# Patient Record
Sex: MALE | Race: WHITE | NOT HISPANIC OR LATINO | Employment: FULL TIME | URBAN - METROPOLITAN AREA
[De-identification: names, ages, dates, MRNs, and addresses within clinical notes are randomized per-mention and may not be internally consistent; named-entity substitution may affect disease eponyms.]

---

## 2021-02-13 ENCOUNTER — IMMUNIZATION (OUTPATIENT)
Dept: NURSING | Facility: CLINIC | Age: 36
End: 2021-02-13
Payer: COMMERCIAL

## 2021-02-13 PROCEDURE — 0001A PR COVID VAC PFIZER DIL RECON 30 MCG/0.3 ML IM: CPT

## 2021-02-13 PROCEDURE — 91300 PR COVID VAC PFIZER DIL RECON 30 MCG/0.3 ML IM: CPT

## 2021-03-06 ENCOUNTER — IMMUNIZATION (OUTPATIENT)
Dept: NURSING | Facility: CLINIC | Age: 36
End: 2021-03-06
Attending: INTERNAL MEDICINE
Payer: COMMERCIAL

## 2021-03-06 PROCEDURE — 91300 PR COVID VAC PFIZER DIL RECON 30 MCG/0.3 ML IM: CPT

## 2021-03-06 PROCEDURE — 0002A PR COVID VAC PFIZER DIL RECON 30 MCG/0.3 ML IM: CPT

## 2022-02-16 ENCOUNTER — TELEPHONE (OUTPATIENT)
Dept: SURGERY | Facility: CLINIC | Age: 37
End: 2022-02-16

## 2022-02-16 ENCOUNTER — OFFICE VISIT (OUTPATIENT)
Dept: SURGERY | Facility: CLINIC | Age: 37
End: 2022-02-16
Payer: COMMERCIAL

## 2022-02-16 VITALS
SYSTOLIC BLOOD PRESSURE: 120 MMHG | RESPIRATION RATE: 16 BRPM | DIASTOLIC BLOOD PRESSURE: 72 MMHG | HEART RATE: 80 BPM | BODY MASS INDEX: 21.82 KG/M2 | WEIGHT: 170 LBS | OXYGEN SATURATION: 98 % | HEIGHT: 74 IN

## 2022-02-16 DIAGNOSIS — Z11.59 ENCOUNTER FOR SCREENING FOR OTHER VIRAL DISEASES: Primary | ICD-10-CM

## 2022-02-16 DIAGNOSIS — K40.90 RIGHT INGUINAL HERNIA: Primary | ICD-10-CM

## 2022-02-16 PROCEDURE — 99203 OFFICE O/P NEW LOW 30 MIN: CPT | Performed by: SURGERY

## 2022-02-16 NOTE — PROGRESS NOTES
"[facility]  Surgery Consultation    HPI: Patient is a 36 year old male who is here for consultation requested by No primary care provider on file. None for evaluation of right inguinal hernia. Hernia has been present for last 1 weeks. Pain is primarily located in the right groin. Patient states pain is worse with standing. He attended 2 concerts and was on his feet for a long time causing increased symptoms.Pain is rated as moderate. Symptoms are improved with lying flat. Hernia has not been incarcerated. Patient has not had any symptoms of bowel obstruction. Patient denies fevers, chills, nausea, vomiting, SOB, chest pain, abdominal pain..    PMH:   has a past medical history of CARDIOVASCULAR SCREENING; LDL GOAL LESS THAN 160 and NO ACTIVE PROBLEMS.  PSH:    has a past surgical history that includes zzhc or ocular device intraop detached retina (2003).  Social History:   reports that he has never smoked. He has never used smokeless tobacco. He reports current alcohol use. He reports that he does not use drugs.  Family History:  Family history personally reviewed and revealed no pertinent history.    Medications/Allergies: Home medications and allergies reviewed.    ROS:  The 12 point Review of Systems is negative other than noted in the HPI.    Physical Exam:  /72   Pulse 80   Resp 16   Ht 1.886 m (6' 2.25\")   Wt 77.1 kg (170 lb)   SpO2 98%   BMI 21.68 kg/m    GENERAL: Generally appears well.  Psych: Alert and Oriented.  Normal affect  Eyes: Sclera clear  Respiratory:  Lungs with good air excursion  Cardiovascular:  Normal peripheral pulses  GI: Abdomen Soft Non-Tender entire abdomen No hernias palpated..  Groin- I examined the patient in both the standing and supine positions. Right Groin- moderate sized inguinal hernia and hernia was reducible Left Groin- no hernia palpated and no tenderness No scrotal or testicle abnormalities.  Lymphatic/Hematologic/Immune:  No cervical " lymphadenopathy.  Integumentary:  No rashes  Neurological: grossly intact     All new lab and imaging data was reviewed.     Impression and Plan:  Patient is a 36 year old male with right inguinal hernia    PLAN:  I discussed the pathophysiology of hernias and options for repair including laparoscopic VS open. He would like to go forward with robotic or laparoscopic repair with mesh.   The risks associated with the procedure including, but not limited to, recurrence, nerve entrapment or injury, persistence of pain, injury to the bowel/bladder, infertility, hematoma, mesh migration, mesh infection, MI, and PE were discussed with the patient. He indicated understanding of the discussion, asked appropriate questions, and provided consent. Signs and symptoms of incarceration were discussed. If these develop in the interim, he promises to call or go straight to the ER. I have provided the patient with an information pamphlet.  Thank you very much for this consult.    Deo Craig M.D.  [facility]  [phone]    Please route or send letter to:  Primary Care Provider (PCP) and Referring Provider

## 2022-02-16 NOTE — TELEPHONE ENCOUNTER
Type of surgery: robot assisted right inguinal hernia repair  Location of surgery: [facility]  Date and time of surgery: 3/1/22 8:10am  Surgeon: Dr Craig  Pre-Op Appt Date: pt to schedule  Post-Op Appt Date: pt to schedule   Packet sent out: Yes  Pre-cert/Authorization completed:  Not Applicable  Date: 2/16/22

## 2022-02-26 ENCOUNTER — LAB (OUTPATIENT)
Dept: URGENT CARE | Facility: URGENT CARE | Age: 37
End: 2022-02-26
Payer: COMMERCIAL

## 2022-02-26 DIAGNOSIS — Z11.59 ENCOUNTER FOR SCREENING FOR OTHER VIRAL DISEASES: ICD-10-CM

## 2022-02-26 PROCEDURE — U0003 INFECTIOUS AGENT DETECTION BY NUCLEIC ACID (DNA OR RNA); SEVERE ACUTE RESPIRATORY SYNDROME CORONAVIRUS 2 (SARS-COV-2) (CORONAVIRUS DISEASE [COVID-19]), AMPLIFIED PROBE TECHNIQUE, MAKING USE OF HIGH THROUGHPUT TECHNOLOGIES AS DESCRIBED BY CMS-2020-01-R: HCPCS

## 2022-02-26 PROCEDURE — U0005 INFEC AGEN DETEC AMPLI PROBE: HCPCS

## 2022-02-27 LAB — SARS-COV-2 RNA RESP QL NAA+PROBE: NEGATIVE

## 2022-02-28 ENCOUNTER — OFFICE VISIT (OUTPATIENT)
Dept: FAMILY MEDICINE | Facility: CLINIC | Age: 37
End: 2022-02-28
Payer: COMMERCIAL

## 2022-02-28 ENCOUNTER — ANESTHESIA EVENT (OUTPATIENT)
Dept: SURGERY | Facility: CLINIC | Age: 37
End: 2022-02-28
Payer: COMMERCIAL

## 2022-02-28 VITALS
TEMPERATURE: 97.6 F | SYSTOLIC BLOOD PRESSURE: 156 MMHG | RESPIRATION RATE: 16 BRPM | WEIGHT: 230 LBS | BODY MASS INDEX: 29.52 KG/M2 | HEIGHT: 74 IN | OXYGEN SATURATION: 99 % | HEART RATE: 100 BPM | DIASTOLIC BLOOD PRESSURE: 110 MMHG

## 2022-02-28 DIAGNOSIS — I45.10 INCOMPLETE RIGHT BUNDLE BRANCH BLOCK: ICD-10-CM

## 2022-02-28 DIAGNOSIS — Z01.818 PREOPERATIVE EXAMINATION: Primary | ICD-10-CM

## 2022-02-28 DIAGNOSIS — K40.90 RIGHT INGUINAL HERNIA: ICD-10-CM

## 2022-02-28 DIAGNOSIS — R03.0 ELEVATED BLOOD PRESSURE READING WITHOUT DIAGNOSIS OF HYPERTENSION: ICD-10-CM

## 2022-02-28 LAB
ANION GAP SERPL CALCULATED.3IONS-SCNC: 8 MMOL/L (ref 3–14)
BUN SERPL-MCNC: 11 MG/DL (ref 7–30)
CALCIUM SERPL-MCNC: 9.7 MG/DL (ref 8.5–10.1)
CHLORIDE BLD-SCNC: 105 MMOL/L (ref 94–109)
CO2 SERPL-SCNC: 25 MMOL/L (ref 20–32)
CREAT SERPL-MCNC: 0.9 MG/DL (ref 0.66–1.25)
ERYTHROCYTE [DISTWIDTH] IN BLOOD BY AUTOMATED COUNT: 12.6 % (ref 10–15)
GFR SERPL CREATININE-BSD FRML MDRD: >90 ML/MIN/1.73M2
GLUCOSE BLD-MCNC: 105 MG/DL (ref 70–99)
HCT VFR BLD AUTO: 51.6 % (ref 40–53)
HGB BLD-MCNC: 17.8 G/DL (ref 13.3–17.7)
MCH RBC QN AUTO: 31.8 PG (ref 26.5–33)
MCHC RBC AUTO-ENTMCNC: 34.5 G/DL (ref 31.5–36.5)
MCV RBC AUTO: 92 FL (ref 78–100)
PLATELET # BLD AUTO: 224 10E3/UL (ref 150–450)
POTASSIUM BLD-SCNC: 4.4 MMOL/L (ref 3.4–5.3)
RBC # BLD AUTO: 5.59 10E6/UL (ref 4.4–5.9)
SODIUM SERPL-SCNC: 138 MMOL/L (ref 133–144)
WBC # BLD AUTO: 5.7 10E3/UL (ref 4–11)

## 2022-02-28 PROCEDURE — 36415 COLL VENOUS BLD VENIPUNCTURE: CPT | Performed by: INTERNAL MEDICINE

## 2022-02-28 PROCEDURE — 99204 OFFICE O/P NEW MOD 45 MIN: CPT | Performed by: INTERNAL MEDICINE

## 2022-02-28 PROCEDURE — 85027 COMPLETE CBC AUTOMATED: CPT | Performed by: INTERNAL MEDICINE

## 2022-02-28 PROCEDURE — 80048 BASIC METABOLIC PNL TOTAL CA: CPT | Performed by: INTERNAL MEDICINE

## 2022-02-28 RX ORDER — AMLODIPINE BESYLATE 5 MG/1
5 TABLET ORAL DAILY
Qty: 10 TABLET | Refills: 0 | Status: SHIPPED | OUTPATIENT
Start: 2022-02-28 | End: 2022-03-07

## 2022-02-28 ASSESSMENT — ENCOUNTER SYMPTOMS
FEVER: 0
SHORTNESS OF BREATH: 0
CHILLS: 0
LIGHT-HEADEDNESS: 0
PALPITATIONS: 0
CHEST TIGHTNESS: 0
COUGH: 0
FATIGUE: 0

## 2022-02-28 NOTE — PATIENT INSTRUCTIONS
Avoid aspirin 7 days before the surgery. Avoid nonsteroidal anti-inflammatory pain medication like ibuprofen, Motrin, or Aleve 7 days before the surgery.  Tylenol can be used for pain.  Avoid any over the counter multivitamins or herbal supplement 7 days before surgery   You can resume these medications after surgery    Purchase a home blood pressure cuff that  checks your blood pressure on your arm not  your wrist.     Omron is a good brand. You can  check if the cuff you purchased is valid by going  to [email or web address]    Take your blood pressure after 5 minutes of rest  in the AM and PM for one week and record the  readings (14 total readings).    Send me a [email or web address] message with those  readings upon doing so.     Seek immediate medical attention if you develop:  - severe/worsening headache  - fever/chills  - nausea/vomiting  - changes in vision  - slurring of speech  - disorientation/confusion  - increased somnolence  - numbness/weakness of your arm or leg  - any other unusual symptoms

## 2022-02-28 NOTE — PROGRESS NOTES
[address], [address]  [facility] [address]  Phone: [phone]  Primary Provider: No primary care provider on file.  Pre-op Performing Provider: JEAN GONZALEZ      PREOPERATIVE EVALUATION:  Today's date: 2/28/2022    Femi Soni is a 37 year old male who presents for a preoperative evaluation.    Surgical Information:  Surgery/Procedure: Robot-assisted right inguinal hernia repair  Surgery Location:  OR  Surgeon: Dr. Craig  Surgery Date: 03/01/2022  Time of Surgery: 0810 am  Where patient plans to recover: At home with family  Fax number for surgical facility: Note does not need to be faxed, will be available electronically in Epic.    Type of Anesthesia Anticipated: General    Assessment & Plan     The proposed surgical procedure is considered INTERMEDIATE risk.    Preoperative examination    - CBC with platelets; Future  - Basic metabolic panel  (Ca, Cl, CO2, Creat, Gluc, K, Na, BUN); Future    Right inguinal hernia  Scheduled for procedure tomorrow.    Elevated blood pressure reading without diagnosis of hypertension  Discussed with the patient to take amlodipine 5 mg today and tomorrow morning before the procedure.  After he recovers from surgery he should make a follow-up appointment.  Discussed about checking blood pressure daily after his procedure.  He should seek immediate medical attention if he has alarm symptoms along with high blood pressure reading.  - amLODIPine (NORVASC) 5 MG tablet; Take 1 tablet (5 mg) by mouth daily    Incomplete right bundle branch block  Back in 2014 and 2018 patient had palpitations and an EKG and stress echo revealed incomplete right bundle branch block.  He had a visit with cardiologist in 2018 and was told that he does not have any heart disease.  Echo also showed normal LVEF and no wall motion abnormality.  He denies chest pain, palpitations or exertional symptoms.    Risks and Recommendations:  The patient has the  following additional risks and recommendations for perioperative complications:   - No identified additional risk factors other than previously addressed    Medication Instructions:  Patient is on no chronic medications    RECOMMENDATION:  APPROVAL GIVEN to proceed with proposed procedure pending review of diagnostic evaluation.    Subjective     HPI related to upcoming procedure:   Mr. Soni is a 37-year-old gentleman who presented to the clinic for preop exam.  He is new to [facility] and does not have a current primary care provider.  Patient is undergoing robot-assisted right inguinal hernia repair tomorrow which is 3/1/2022.  He has no medical conditions and does not take any medications.  He does have a past history of palpitations in 2014 and 2018.  A stress echo and an EKG showed right bundle branch block.  Patient had a visit with a cardiologist at [facility] and he was told that he does not have any heart disease and he should follow up with a primary care for blood pressure monitoring.  His blood pressure in the clinic today is 156/110 and repeat is 160/110.  He does not check his blood pressure at home.  He does not have any recent lab work.  Patient denies chest pain, shortness of breath, palpitations, headache, lightheadedness, syncope, fever or chills. Patient is able to climb 1 flight of stairs without feeling short of breath or having chest pain.  Patient denies personal or family history of complications with anesthesia. Patient does not have a history of heart failure or TIA/stroke. Patient does not smoke.  He drinks 8-12 alcoholic drinks per week      Preop Questions 2/28/2022   1. Have you ever had a heart attack or stroke? No   2. Have you ever had surgery on your heart or blood vessels, such as a stent placement, a coronary artery bypass, or surgery on an artery in your head, neck, heart, or legs? No   3. Do you have chest pain with activity? No   4. Do you have a history of  heart  failure? No   5. Do you currently have a cold, bronchitis or symptoms of other infection? No   6. Do you have a cough, shortness of breath, or wheezing? No   7. Do you or anyone in your family have previous history of blood clots? No   8. Do you or does anyone in your family have a serious bleeding problem such as prolonged bleeding following surgeries or cuts? No   9. Have you ever had problems with anemia or been told to take iron pills? No   10. Have you had any abnormal blood loss such as black, tarry or bloody stools? No   11. Have you ever had a blood transfusion? No   12. Are you willing to have a blood transfusion if it is medically needed before, during, or after your surgery? Yes   13. Have you or any of your relatives ever had problems with anesthesia? No   14. Do you have sleep apnea, excessive snoring or daytime drowsiness? No   15. Do you have any artifical heart valves or other implanted medical devices like a pacemaker, defibrillator, or continuous glucose monitor? No   16. Do you have artificial joints? No   17. Are you allergic to latex? No     Health Care Directive:  Patient does not have a Health Care Directive or Living Will: Discussed advance care planning with patient; however, patient declined at this time.    Preoperative Review of :   reviewed - no record of controlled substances prescribed.      Review of Systems   Constitutional: Negative for chills, fatigue and fever.   Respiratory: Negative for cough, chest tightness and shortness of breath.    Cardiovascular: Negative for chest pain and palpitations.   Neurological: Negative for syncope and light-headedness.   All other systems reviewed and are negative.      Patient Active Problem List    Diagnosis Date Noted     Preoperative examination 02/28/2022     Priority: Medium     Right inguinal hernia 02/28/2022     Priority: Medium     Elevated blood pressure reading without diagnosis of hypertension 02/28/2022     Priority: Medium  "    CARDIOVASCULAR SCREENING; LDL GOAL LESS THAN 160 02/27/2013     Priority: Medium      Past Medical History:   Diagnosis Date     CARDIOVASCULAR SCREENING; LDL GOAL LESS THAN 160      NO ACTIVE PROBLEMS      Past Surgical History:   Procedure Laterality Date     HC OR OCULAR DEVICE INTRAOP DETACHED RETINA  2003    Detached Retina Repair - Right Eye     Current Outpatient Medications   Medication Sig Dispense Refill     amLODIPine (NORVASC) 5 MG tablet Take 1 tablet (5 mg) by mouth daily 10 tablet 0       No Known Allergies     Social History     Tobacco Use     Smoking status: Never Smoker     Smokeless tobacco: Never Used   Substance Use Topics     Alcohol use: Yes     Comment: Socially - 5 to 10 drinks a week     History   Drug Use No         Objective     BP (!) 156/110 (BP Location: Right arm, Patient Position: Sitting, Cuff Size: Adult Regular)   Pulse 100   Temp 97.6  F (36.4  C) (Temporal)   Resp 16   Ht 1.886 m (6' 2.25\")   Wt 104.3 kg (230 lb)   SpO2 99%   BMI 29.33 kg/m      Physical Exam  Vitals reviewed.   Cardiovascular:      Rate and Rhythm: Normal rate and regular rhythm.      Heart sounds: Normal heart sounds. No murmur heard.    No gallop.   Pulmonary:      Effort: Pulmonary effort is normal. No respiratory distress.      Breath sounds: Normal breath sounds. No wheezing or rales.       No results for input(s): HGB, PLT, INR, NA, POTASSIUM, CR, A1C in the last 12735 hours.     Diagnostics:  Labs pending at this time.  Results will be reviewed when available.   No EKG required, no history of coronary heart disease, significant arrhythmia, peripheral arterial disease or other structural heart disease.    Revised Cardiac Risk Index (RCRI):  The patient has the following serious cardiovascular risks for perioperative complications:   - No serious cardiac risks = 0 points     RCRI Interpretation: 0 points: Class I (very low risk - 0.4% complication rate)         Signed Electronically by: JEAN " MD LISA  Copy of this evaluation report is provided to requesting physician.

## 2022-02-28 NOTE — NURSING NOTE
Dr. Cook notified of elevated blood pressure reading.    Ajith Cuevas, CRISTIAN on 2/28/2022 at 9:29 AM

## 2022-02-28 NOTE — LETTER
March 1, 2022      Femi Soni  [address]   [facility] [address]        Dear ,    Your test results are within normal limits.     Adela Cook MD    Resulted Orders   CBC with platelets   Result Value Ref Range    WBC Count 5.7 4.0 - 11.0 10e3/uL    RBC Count 5.59 4.40 - 5.90 10e6/uL    Hemoglobin 17.8 (H) 13.3 - 17.7 g/dL    Hematocrit 51.6 40.0 - 53.0 %    MCV 92 78 - 100 fL    MCH 31.8 26.5 - 33.0 pg    MCHC 34.5 31.5 - 36.5 g/dL    RDW 12.6 10.0 - 15.0 %    Platelet Count 224 150 - 450 10e3/uL   Basic metabolic panel  (Ca, Cl, CO2, Creat, Gluc, K, Na, BUN)   Result Value Ref Range    Sodium 138 133 - 144 mmol/L    Potassium 4.4 3.4 - 5.3 mmol/L    Chloride 105 94 - 109 mmol/L    Carbon Dioxide (CO2) 25 20 - 32 mmol/L    Anion Gap 8 3 - 14 mmol/L    Urea Nitrogen 11 7 - 30 mg/dL    Creatinine 0.90 0.66 - 1.25 mg/dL    Calcium 9.7 8.5 - 10.1 mg/dL    Glucose 105 (H) 70 - 99 mg/dL    GFR Estimate >90 >60 mL/min/1.73m2      Comment:      Effective December 21, 2021 eGFRcr in adults is calculated using the 2021 CKD-EPI creatinine equation which includes age and gender (Elias acosta al., NEJM, DOI: 10.1056/BEERaq8845245)

## 2022-03-01 ENCOUNTER — ANESTHESIA (OUTPATIENT)
Dept: SURGERY | Facility: CLINIC | Age: 37
End: 2022-03-01
Payer: COMMERCIAL

## 2022-03-01 ENCOUNTER — HOSPITAL ENCOUNTER (OUTPATIENT)
Facility: CLINIC | Age: 37
Discharge: HOME | End: 2022-03-01
Attending: SURGERY | Admitting: SURGERY
Payer: COMMERCIAL

## 2022-03-01 VITALS
DIASTOLIC BLOOD PRESSURE: 106 MMHG | HEART RATE: 103 BPM | OXYGEN SATURATION: 97 % | BODY MASS INDEX: 28.27 KG/M2 | TEMPERATURE: 98.3 F | SYSTOLIC BLOOD PRESSURE: 164 MMHG | WEIGHT: 227.4 LBS | HEIGHT: 75 IN | RESPIRATION RATE: 20 BRPM

## 2022-03-01 DIAGNOSIS — G89.18 POSTOPERATIVE PAIN: Primary | ICD-10-CM

## 2022-03-01 PROCEDURE — 258N000003 HC RX IP 258 OP 636: Performed by: ANESTHESIOLOGY

## 2022-03-01 PROCEDURE — 999N000141 HC STATISTIC PRE-PROCEDURE NURSING ASSESSMENT: Performed by: SURGERY

## 2022-03-01 RX ORDER — HYDROCODONE BITARTRATE AND ACETAMINOPHEN 5; 325 MG/1; MG/1
1 TABLET ORAL
Status: CANCELLED | OUTPATIENT
Start: 2022-03-01

## 2022-03-01 RX ORDER — HYDROCODONE BITARTRATE AND ACETAMINOPHEN 5; 325 MG/1; MG/1
1 TABLET ORAL EVERY 4 HOURS PRN
Qty: 12 TABLET | Refills: 0 | Status: SHIPPED | OUTPATIENT
Start: 2022-03-01 | End: 2022-03-07

## 2022-03-01 RX ORDER — CEFAZOLIN SODIUM/WATER 2 G/20 ML
2 SYRINGE (ML) INTRAVENOUS
Status: DISCONTINUED | OUTPATIENT
Start: 2022-03-01 | End: 2022-03-01 | Stop reason: HOSPADM

## 2022-03-01 RX ORDER — AMOXICILLIN 250 MG
1-2 CAPSULE ORAL 2 TIMES DAILY
Qty: 30 TABLET | Refills: 0 | Status: SHIPPED | OUTPATIENT
Start: 2022-03-01 | End: 2022-03-07

## 2022-03-01 RX ORDER — SODIUM CHLORIDE, SODIUM LACTATE, POTASSIUM CHLORIDE, CALCIUM CHLORIDE 600; 310; 30; 20 MG/100ML; MG/100ML; MG/100ML; MG/100ML
INJECTION, SOLUTION INTRAVENOUS CONTINUOUS
Status: DISCONTINUED | OUTPATIENT
Start: 2022-03-01 | End: 2022-03-01 | Stop reason: HOSPADM

## 2022-03-01 RX ORDER — CEFAZOLIN SODIUM/WATER 2 G/20 ML
2 SYRINGE (ML) INTRAVENOUS SEE ADMIN INSTRUCTIONS
Status: DISCONTINUED | OUTPATIENT
Start: 2022-03-01 | End: 2022-03-01 | Stop reason: HOSPADM

## 2022-03-01 RX ADMIN — SODIUM CHLORIDE, POTASSIUM CHLORIDE, SODIUM LACTATE AND CALCIUM CHLORIDE: 600; 310; 30; 20 INJECTION, SOLUTION INTRAVENOUS at 06:51

## 2022-03-01 NOTE — DISCHARGE INSTRUCTIONS
[facility]  Discharge Instructions: Post-Operative Robotic Inguinal Hernia Repair    ACTIVITY    Take frequent, short walks and increase your activity gradually.      Avoid strenuous physical activity or heavy lifting greater than 15 lbs. for 3-4 weeks.  You may climb stairs.    You may drive without restrictions when you are not using any prescription pain medication and feel comfortable in a car.    You may return to work/school when you are comfortable without any prescription pain medication.    WOUND CARE    You may remove your outer dressing or Band-Aids and shower 48 hours after the surgery.  Pat your incisions dry and leave them open to air.  Re-apply dressing (Band-Aids or gauze/tape) as needed for comfort or drainage.    You may have steri-strips (looks like white tape) on your incision.  You may peel off the steri-strips 2 weeks after your surgery if they have not peeled off on their own.     Do not soak your incisions in a tub or pool for 2 weeks.     Do not apply any lotions, creams, or ointments to your incisions.    A ridge under your incisions is normal and will gradually resolve.    DIET    Start with liquids, then gradually resume your regular diet as tolerated.     Drink plenty of fluids to stay hydrated.    PAIN    Expect some tenderness and discomfort at the incision site(s).  Use the prescribed pain medication at your discretion.  Expect gradual resolution of your pain over several days.    You may take ibuprofen with food (unless you have been told not to) or acetaminophen/Tylenol instead of or in addition to your prescribed pain medication.  If you are taking Norco, do not take any additional acetaminophen/Tylenol.    Do not drink alcohol or drive while you are taking pain medications.    You may apply ice to your incisions in 20 minute intervals as needed for the next 48 hours.  After that time, consider switching to heat if you prefer.    EXPECTATIONS    You  may notice air in your scrotum and/or penis after the surgery.  This is due to the gas used during the surgery.  You can expect some swelling and bruising involving the scrotum and/or penis as well as numbness.  These symptoms are expected and should gradually resolve in the next few days.  You may use ice to help with the swelling and try placing a rolled hand towel below your scrotum to help alleviate scrotal discomfort.  If you develop significant testicular or penile pain, please call our office and speak with a nurse.    Pain medications can cause constipation.  Limit use when possible.  Take over the counter stool softener/stimulant, such as Colace or Senna, 1-2 times a day with plenty of water.  You may take a mild over the counter laxative, such as Miralax or a suppository, as needed.  You may take 1 oz. (2 tablespoons) Milk of Magnesia the evening following surgery to encourage bowel movement.  You may discontinue these medications once you are having regular bowel movements and/or are no longer taking your narcotic pain medication.     You may have shoulder or upper back discomfort due to the gas used in surgery.  This is temporary and should resolve in 48-72 hours.  Short, frequent walks may help with this.    If you are unable to urinate, or feel as though you are not emptying your bladder adequately, we recommend you call our office and/or seek care at an ER or Urgent Care facility if after hours.    FOLLOW UP    Our office will contact you in approximately 2 weeks to check on your progress and answer any questions you may have.  If you are doing well, you will not need to return for a follow up appointment.  If any concerns are identified over the phone, we will help you make an appointment to see a provider.     If you have not received a phone call, have any questions or concerns, or would like to be seen, please call us at [phone] and ask to speak with our nurse.  We are located at [address]  [facility] [address] [place] [address].    CALL OUR OFFICE -[phone] IF YOU HAVE:     Chills or fever above 101 F.    Increased redness, warmth, or drainage at your incisions.    Significant bleeding.    Pain not relieved by your pain medication or rest.    Increasing pain after the first 48 hours.    Any other concerns or questions.               **If you have concerns or questions about your procedure,    please contact Dr. Craig at  [phone]**

## 2022-03-01 NOTE — ANESTHESIA PREPROCEDURE EVALUATION
Anesthesia Pre-Procedure Evaluation    Patient: Femi Soni   MRN: [id number] : 1985        Procedure : Procedure(s):  ROBOT-ASSISTED RIGHT INGUINAL HERNIA REPAIR          Past Medical History:   Diagnosis Date     CARDIOVASCULAR SCREENING; LDL GOAL LESS THAN 160      NO ACTIVE PROBLEMS       Past Surgical History:   Procedure Laterality Date     HC OR OCULAR DEVICE INTRAOP DETACHED RETINA      Detached Retina Repair - Right Eye      No Known Allergies   Social History     Tobacco Use     Smoking status: Never Smoker     Smokeless tobacco: Never Used   Substance Use Topics     Alcohol use: Yes     Comment: Socially - 5 to 10 drinks a week      Wt Readings from Last 1 Encounters:   22 104.3 kg (230 lb)        Anesthesia Evaluation            ROS/MED HX  ENT/Pulmonary:    (-) sleep apnea   Neurologic:       Cardiovascular: Comment: Elevated BP in clinic  - PCMD started Pt on amlodipine     IRBBB    (+) -----Irregular Heartbeat/Palpitations, Previous cardiac testing   Echo: Date: Results:    Stress Test: Date:  Results:  No ischemia   ECG Reviewed: Date: Results:    Cath: Date: Results:      METS/Exercise Tolerance:     Hematologic:       Musculoskeletal:       GI/Hepatic:    (-) GERD   Renal/Genitourinary:       Endo:       Psychiatric/Substance Use:       Infectious Disease:       Malignancy:       Other:               OUTSIDE LABS:  CBC:   Lab Results   Component Value Date    WBC 5.7 2022    HGB 17.8 (H) 2022    HCT 51.6 2022     2022     BMP:   Lab Results   Component Value Date     2022    POTASSIUM 4.4 2022    CHLORIDE 105 2022    CO2 25 2022    BUN 11 2022    CR 0.90 2022     (H) 2022     COAGS: No results found for: PTT, INR, FIBR  POC: No results found for: BGM, HCG, HCGS  HEPATIC: No results found for: ALBUMIN, PROTTOTAL, ALT, AST, GGT, ALKPHOS, BILITOTAL, BILIDIRECT, EMELY  OTHER:   Lab  Results   Component Value Date    TAN 9.7 02/28/2022       Anesthesia Plan                        Consents            Postoperative Care            Comments:    Other Comments: BP not adequately controlled.  Discussed with Mr Soni and Dr Craig, will postpone surgery until BP optimized.  All agree with plan.    Mr Soni will call his PCP this morning and report his BP data and request a plan.             Chelo Alvraez MD

## 2022-03-07 ENCOUNTER — VIRTUAL VISIT (OUTPATIENT)
Dept: FAMILY MEDICINE | Facility: CLINIC | Age: 37
End: 2022-03-07
Payer: COMMERCIAL

## 2022-03-07 VITALS — SYSTOLIC BLOOD PRESSURE: 132 MMHG | DIASTOLIC BLOOD PRESSURE: 89 MMHG

## 2022-03-07 DIAGNOSIS — K40.90 RIGHT INGUINAL HERNIA: ICD-10-CM

## 2022-03-07 DIAGNOSIS — I10 PRIMARY HYPERTENSION: Primary | ICD-10-CM

## 2022-03-07 PROBLEM — R03.0 ELEVATED BLOOD PRESSURE READING WITHOUT DIAGNOSIS OF HYPERTENSION: Status: RESOLVED | Noted: 2022-02-28 | Resolved: 2022-03-07

## 2022-03-07 PROCEDURE — 99213 OFFICE O/P EST LOW 20 MIN: CPT | Mod: GT | Performed by: INTERNAL MEDICINE

## 2022-03-07 RX ORDER — AMLODIPINE BESYLATE 10 MG/1
10 TABLET ORAL DAILY
Qty: 60 TABLET | Refills: 0 | Status: SHIPPED | OUTPATIENT
Start: 2022-03-07 | End: 2022-04-12

## 2022-03-07 NOTE — PATIENT INSTRUCTIONS
Purchase a home blood pressure cuff that  checks your blood pressure on your arm not  your wrist.     Omron is a good brand. You can  check if the cuff you purchased is valid by going  to [email or web address]    Take your blood pressure after 5 minutes of rest  in the AM and PM for one week and record the  readings (14 total readings).    Send me a [employer] message with those  readings upon doing so.

## 2022-03-07 NOTE — PROGRESS NOTES
"Femi is a 37 year old who is being evaluated via a billable video visit.      How would you like to obtain your AVS? [employer] [employer]  If the video visit is dropped, the invitation should be resent by: Text to cell phone: [employer]IS[employer] [phone]  Will anyone else be joining your video visit? No      Video Start Time: 11:37 am     Assessment & Plan     Primary hypertension  Increase amlodipine to 10 mg daily.  Discussed with the patient to check blood pressure daily and send me a [employer] message in 7 to 10 days.  Once his readings are stable and <130/80 consistently for 1 week he will be able to schedule his surgery.  Informed patient about validate [email or web address] where he can check if his current blood pressure device is accurate.  - amLODIPine (NORVASC) 10 MG tablet; Take 1 tablet (10 mg) by mouth daily    Right inguinal hernia  Patient will schedule surgery when blood pressure is stable.       BMI:   Estimated body mass index is 28.42 kg/m  as calculated from the following:    Height as of 3/1/22: 1.905 m (6' 3\").    Weight as of 3/1/22: 103.1 kg (227 lb 6.4 oz).   Weight management plan: Deferred to next visit.    See Patient Instructions    Return in about 4 weeks (around 4/4/2022) for Follow up, with me.    JEAN GONZALEZ MD  [facility]   Feim is a 37 year old who presents for the following health issues     Femi is a 37-year-old gentleman who had a video visit scheduled today.  Due to some technical problems this was turned into a telephone visit.    Femi came to our clinic 2 weeks ago for a preop exam for a right inguinal hernia repair.  His blood pressure was high and his surgery was postponed.    He was started on amlodipine 5 mg daily.  His blood pressure readings are between 110-141 systolic and  diastolic.  He has his parents blood pressure machine which is old.  Patient is asking if he should get anyone.      History of Present Illness       Hypertension: He presents for " follow up of hypertension.  He does check blood pressure  regularly outside of the clinic. Outside blood pressures have been over 140/90. He follows a low salt diet.     He eats 4 or more servings of fruits and vegetables daily.He consumes 0 sweetened beverage(s) daily.He exercises with enough effort to increase his heart rate 30 to 60 minutes per day.  He exercises with enough effort to increase his heart rate 5 days per week.   He is taking medications regularly.       Review of Systems       Objective    Vitals - Patient Reported  Systolic (Patient Reported): 132  Diastolic (Patient Reported): 89  Weight (Patient Reported): 99.8 kg (220 lb)    Physical Exam   GEN: No acute distress  RESP: No audible increased work of breathing. Patient speaking in full sentences without distress.  PSYCH: pleasant  Exam otherwise limited due to virtual platform      Video-Visit Details    Type of service:  Video Visit    Video End Time:11:48 am     Originating Location (pt. Location): Home    Distant Location (provider location):  [facility]     Platform used for Video Visit: Other: Telephone

## 2022-03-16 ENCOUNTER — MYC MEDICAL ADVICE (OUTPATIENT)
Dept: FAMILY MEDICINE | Facility: CLINIC | Age: 37
End: 2022-03-16
Payer: COMMERCIAL

## 2022-03-16 DIAGNOSIS — I10 PRIMARY HYPERTENSION: Primary | ICD-10-CM

## 2022-03-16 NOTE — TELEPHONE ENCOUNTER
Please see patient's mychart:    Please review BP readings.    Please reply back to patient, route to triage follow up, or route to team coordinators to have patient schedule an appointment.     Romana Goodwin RN  [facility]

## 2022-03-19 ENCOUNTER — HEALTH MAINTENANCE LETTER (OUTPATIENT)
Age: 37
End: 2022-03-19

## 2022-03-24 RX ORDER — LISINOPRIL 5 MG/1
5 TABLET ORAL DAILY
Qty: 60 TABLET | Refills: 0 | Status: SHIPPED | OUTPATIENT
Start: 2022-03-24 | End: 2022-04-12

## 2022-04-12 ENCOUNTER — OFFICE VISIT (OUTPATIENT)
Dept: CARDIOLOGY | Facility: CLINIC | Age: 37
End: 2022-04-12
Attending: INTERNAL MEDICINE
Payer: COMMERCIAL

## 2022-04-12 VITALS
WEIGHT: 229.5 LBS | BODY MASS INDEX: 29.45 KG/M2 | HEIGHT: 74 IN | OXYGEN SATURATION: 97 % | SYSTOLIC BLOOD PRESSURE: 132 MMHG | HEART RATE: 75 BPM | DIASTOLIC BLOOD PRESSURE: 86 MMHG

## 2022-04-12 DIAGNOSIS — K40.20 NON-RECURRENT BILATERAL INGUINAL HERNIA WITHOUT OBSTRUCTION OR GANGRENE: ICD-10-CM

## 2022-04-12 DIAGNOSIS — I10 PRIMARY HYPERTENSION: Primary | ICD-10-CM

## 2022-04-12 DIAGNOSIS — Z01.810 PRE-OPERATIVE CARDIOVASCULAR EXAMINATION: ICD-10-CM

## 2022-04-12 PROCEDURE — 93000 ELECTROCARDIOGRAM COMPLETE: CPT | Performed by: INTERNAL MEDICINE

## 2022-04-12 PROCEDURE — 99204 OFFICE O/P NEW MOD 45 MIN: CPT | Performed by: INTERNAL MEDICINE

## 2022-04-12 RX ORDER — AMLODIPINE BESYLATE 10 MG/1
10 TABLET ORAL DAILY
Qty: 90 TABLET | Refills: 3 | Status: SHIPPED | OUTPATIENT
Start: 2022-04-12 | End: 2023-06-08

## 2022-04-12 NOTE — PROGRESS NOTES
CARDIOLOGY CLINIC CONSULTATION      REASON FOR CONSULT:   Hypertension    PRIMARY CARE PHYSICIAN:  JEAN COOK        History of Present Illness   Femi Soni is an extremely pleasant 37 year old male here as a new patient to discuss his elevated blood pressures.  He has a past medical history significant only for hypertension.  His maternal grandmother may have had some cardiac issues late in life, but otherwise no heart problems in the family.  He is a never smoker.  He drinks 4-8 beers (16 ounce) per week.  No other drugs.    He presents to clinic today for management of his hypertension.  He had initially been seen in the emergency department in February for a hernia, and was scheduled for a preop exam in late February where his blood pressure was found to be elevated around 160/110 mmHg.  He was started on amlodipine 5 mg daily at that time and his surgery for the hernia was postponed.  He will log his blood pressures at home, and based on these results his amlodipine was increased to 10 mg daily on 3/9/2022.  I reviewed his blood pressures at home, and on the 10 mg of amlodipine, his average blood pressures were around 120/80.  It sounds like Mr. Soni was still concerned about his blood pressure, though, because he certainly did not want his surgery to be canceled, so at that point Dr. Cook placed a referral to cardiology and added lisinopril 5 mg daily.  However, Femi thought this was a replacement for his amlodipine, and has since stopped taking that.  His blood pressures more recently are around 140/90 on the lisinopril 5 mg daily alone.    Symptomatically, he feels fine with no chest pains, shortness of breath, lightheadedness, palpitations, or lower extremity swelling.  He has not been exercising recently due to his hernia, but previously had been very active walking 4-5 miles per day and exercising at the gym.      Assessment & Plan     1. Primary hypertension, previously well controlled on  amlodipine 10 mg daily  2. Upcoming hernia surgery  3. Moderate alcohol use      It was a pleasure meeting with Femi in clinic today.  In regards to his blood pressure management, it appears on my review that his blood pressure was very well controlled on the amlodipine 10 mg daily.  I would recommend that he discontinue the lisinopril and go back to the 10 mg daily of amlodipine.  However, I do think that we should check a transthoracic echocardiogram to look for evidence of LVH or other effects from his possibly longstanding hypertension.  His last echo was a stress echo in 2014 at an outside hospital, and there is no mention on that report of his LV wall thickness, etc.    In regards to his upcoming surgery, his RCRI is 0, and he has been able to complete well greater than 4 METS of exercise with no symptoms, so I think that he is low risk for major cardiac complications with his low risk hernia surgery.  No additional testing or intervention is expected to further lower his risk.      -TTE  -Restart amlodipine 10 mg daily  -Discontinue lisinopril 5 mg daily  -Low risk for MACCE with upcoming hernia surgery      Follow-up:  Presuming that Femi's echo is normal, I do not think that he needs to follow-up regularly with cardiology and can continue to follow with his PCP Dr. Cook.  However, we would always be happy to see Femi back at any time if future with any new questions or concerns.      Daljit Salgado MD  Interventional Cardiology  April 12, 2022        Medications   Current Outpatient Medications   Medication     amLODIPine (NORVASC) 10 MG tablet     No current facility-administered medications for this visit.     Allergies   No Known Allergies      Physical Exam       BP: 132/86 Pulse: 75     SpO2: 97 %      Vital Signs with Ranges  Pulse:  [75] 75  BP: (132)/(86) 132/86  SpO2:  [97 %] 97 %  229 lbs 8 oz    Constitutional: Well-appearing, no acute distress  Respiratory: Normal respiratory  effort, CTAB  Cardiovascular: RRR, no m/r/g.  JVP < 7 cm H2O.  There is no LE edema.  Normal carotid upstrokes, no carotid bruits.

## 2022-04-12 NOTE — LETTER
4/12/2022    JEAN COOK MD  [address] [place] [address]    RE: Femi Soni       Dear Colleague,     I had the pleasure of seeing Femi Soni in the [facility].  CARDIOLOGY CLINIC CONSULTATION      REASON FOR CONSULT:   Hypertension    PRIMARY CARE PHYSICIAN:  JEAN COOK        History of Present Illness   Femi Soni is an extremely pleasant 37 year old male here as a new patient to discuss his elevated blood pressures.  He has a past medical history significant only for hypertension.  His maternal grandmother may have had some cardiac issues late in life, but otherwise no heart problems in the family.  He is a never smoker.  He drinks 4-8 beers (16 ounce) per week.  No other drugs.    He presents to clinic today for management of his hypertension.  He had initially been seen in the emergency department in February for a hernia, and was scheduled for a preop exam in late February where his blood pressure was found to be elevated around 160/110 mmHg.  He was started on amlodipine 5 mg daily at that time and his surgery for the hernia was postponed.  He will log his blood pressures at home, and based on these results his amlodipine was increased to 10 mg daily on 3/9/2022.  I reviewed his blood pressures at home, and on the 10 mg of amlodipine, his average blood pressures were around 120/80.  It sounds like Mr. Soni was still concerned about his blood pressure, though, because he certainly did not want his surgery to be canceled, so at that point Dr. Cook placed a referral to cardiology and added lisinopril 5 mg daily.  However, Femi thought this was a replacement for his amlodipine, and has since stopped taking that.  His blood pressures more recently are around 140/90 on the lisinopril 5 mg daily alone.    Symptomatically, he feels fine with no chest pains, shortness of breath, lightheadedness, palpitations, or lower extremity swelling.  He has not been  exercising recently due to his hernia, but previously had been very active walking 4-5 miles per day and exercising at the gym.      Assessment & Plan     1. Primary hypertension, previously well controlled on amlodipine 10 mg daily  2. Upcoming hernia surgery  3. Moderate alcohol use      It was a pleasure meeting with Femi in clinic today.  In regards to his blood pressure management, it appears on my review that his blood pressure was very well controlled on the amlodipine 10 mg daily.  I would recommend that he discontinue the lisinopril and go back to the 10 mg daily of amlodipine.  However, I do think that we should check a transthoracic echocardiogram to look for evidence of LVH or other effects from his possibly longstanding hypertension.  His last echo was a stress echo in 2014 at an outside hospital, and there is no mention on that report of his LV wall thickness, etc.    In regards to his upcoming surgery, his RCRI is 0, and he has been able to complete well greater than 4 METS of exercise with no symptoms, so I think that he is low risk for major cardiac complications with his low risk hernia surgery.  No additional testing or intervention is expected to further lower his risk.      -TTE  -Restart amlodipine 10 mg daily  -Discontinue lisinopril 5 mg daily  -Low risk for MACCE with upcoming hernia surgery      Follow-up:  Presuming that Femi's echo is normal, I do not think that he needs to follow-up regularly with cardiology and can continue to follow with his PCP Dr. Cook.  However, we would always be happy to see Femi back at any time if future with any new questions or concerns.      Daljit Salgado MD  Interventional Cardiology  April 12, 2022        Medications   Current Outpatient Medications   Medication     amLODIPine (NORVASC) 10 MG tablet     No current facility-administered medications for this visit.     Allergies   No Known Allergies      Physical Exam       BP: 132/86 Pulse:  75     SpO2: 97 %      Vital Signs with Ranges  Pulse:  [75] 75  BP: (132)/(86) 132/86  SpO2:  [97 %] 97 %  229 lbs 8 oz    Constitutional: Well-appearing, no acute distress  Respiratory: Normal respiratory effort, CTAB  Cardiovascular: RRR, no m/r/g.  JVP < 7 cm H2O.  There is no LE edema.  Normal carotid upstrokes, no carotid bruits.    Thank you for allowing me to participate in the care of your patient.      Sincerely,     Daljit Salgado MD     [facility]  cc:   Adela Cook MD  [address]  [place] [address]

## 2022-04-13 ENCOUNTER — PREP FOR PROCEDURE (OUTPATIENT)
Dept: SURGERY | Facility: CLINIC | Age: 37
End: 2022-04-13
Payer: COMMERCIAL

## 2022-04-13 DIAGNOSIS — K40.90 RIGHT INGUINAL HERNIA: Primary | ICD-10-CM

## 2022-04-14 ENCOUNTER — TELEPHONE (OUTPATIENT)
Dept: SURGERY | Facility: CLINIC | Age: 37
End: 2022-04-14
Payer: COMMERCIAL

## 2022-04-14 DIAGNOSIS — Z11.59 ENCOUNTER FOR SCREENING FOR OTHER VIRAL DISEASES: Primary | ICD-10-CM

## 2022-04-14 NOTE — TELEPHONE ENCOUNTER
Type of surgery: robot assisted right inguinal hernia repair  Location of surgery: [facility]  Date and time of surgery: 5/16/22 10:00am  Surgeon: Dr Craig  Pre-Op Appt Date: pt to schedule  Post-Op Appt Date: pt to schedule   Packet sent out: Yes  Pre-cert/Authorization completed:  Not Applicable  Date: 4/14/22

## 2022-05-12 ENCOUNTER — OFFICE VISIT (OUTPATIENT)
Dept: FAMILY MEDICINE | Facility: CLINIC | Age: 37
End: 2022-05-12
Payer: COMMERCIAL

## 2022-05-12 VITALS
HEIGHT: 74 IN | BODY MASS INDEX: 29.65 KG/M2 | TEMPERATURE: 97.9 F | DIASTOLIC BLOOD PRESSURE: 97 MMHG | SYSTOLIC BLOOD PRESSURE: 138 MMHG | RESPIRATION RATE: 16 BRPM | HEART RATE: 74 BPM | OXYGEN SATURATION: 100 % | WEIGHT: 231 LBS

## 2022-05-12 DIAGNOSIS — Z01.818 PREOPERATIVE EXAMINATION: Primary | ICD-10-CM

## 2022-05-12 DIAGNOSIS — K40.90 RIGHT INGUINAL HERNIA: ICD-10-CM

## 2022-05-12 DIAGNOSIS — F41.9 ANXIETY: ICD-10-CM

## 2022-05-12 DIAGNOSIS — I10 PRIMARY HYPERTENSION: ICD-10-CM

## 2022-05-12 PROCEDURE — 99214 OFFICE O/P EST MOD 30 MIN: CPT | Performed by: INTERNAL MEDICINE

## 2022-05-12 RX ORDER — LORAZEPAM 1 MG/1
1 TABLET ORAL DAILY PRN
Qty: 3 TABLET | Refills: 0 | Status: SHIPPED | OUTPATIENT
Start: 2022-05-12 | End: 2022-05-15

## 2022-05-12 NOTE — PROGRESS NOTES
[address], [address]  [facility] [address]  Phone: [phone]  Primary Provider: Adela Cook  Pre-op Performing Provider: ADELA COOK      PREOPERATIVE EVALUATION:  Today's date: 5/12/2022    eFmi Soni is a 37 year old male who presents for a preoperative evaluation.    Surgical Information:  Surgery/Procedure: Robot-assisted right inguinal hernia repair  Surgery Location:  OR  Surgeon: Dr. Craig  Surgery Date: 05/16/22  Time of Surgery: 1000 am  Where patient plans to recover: At home with family  Fax number for surgical facility: Note does not need to be faxed, will be available electronically in Epic.    Type of Anesthesia Anticipated: General    Assessment & Plan     The proposed surgical procedure is considered INTERMEDIATE risk.    Preoperative examination  Approval given for the procedure.   Take ativan and amlodipine in the morning of procedure.   - LORazepam (ATIVAN) 1 MG tablet; Take 1 tablet (1 mg) by mouth daily as needed for anxiety    Primary hypertension  Well controlled with amlodipine 10 mg daily. BP can be high     Right inguinal hernia  Scheduled for hernia repair    Anxiety  Take ativan on the morning of the surgery to help with anxiety.   - LORazepam (ATIVAN) 1 MG tablet; Take 1 tablet (1 mg) by mouth daily as needed for anxiety    Risks and Recommendations:  The patient has the following additional risks and recommendations for perioperative complications:   - No identified additional risk factors other than previously addressed    Medication Instructions:  Patient is to take all scheduled medications on the day of surgery    RECOMMENDATION:  APPROVAL GIVEN to proceed with proposed procedure, without further diagnostic evaluation.    Subjective     HPI related to upcoming procedure:     Patient denies chest pain, shortness of breath, palpitations, headache, lightheadedness, syncope, fever or chills. Patient is able to climb 1 flight of  stairs without feeling short of breath or having chest pain.  Patient denies personal or family history of complications with anesthesia. Patient does not have a history of heart failure or TIA/stroke. Patient does not smoke or drink alcohol.  Elevated blood pressure readings in clinic today. He has been checking BP at home, its within normal range. Today his readings are: Right arm 151/100 and 5 mins later in  Left arm 138/97.       Preop Questions 5/9/2022   1. Have you ever had a heart attack or stroke? No   2. Have you ever had surgery on your heart or blood vessels, such as a stent placement, a coronary artery bypass, or surgery on an artery in your head, neck, heart, or legs? No   3. Do you have chest pain with activity? No   4. Do you have a history of  heart failure? No   5. Do you currently have a cold, bronchitis or symptoms of other infection? No   6. Do you have a cough, shortness of breath, or wheezing? No   7. Do you or anyone in your family have previous history of blood clots? No   8. Do you or does anyone in your family have a serious bleeding problem such as prolonged bleeding following surgeries or cuts? No   9. Have you ever had problems with anemia or been told to take iron pills? No   10. Have you had any abnormal blood loss such as black, tarry or bloody stools? No   11. Have you ever had a blood transfusion? No   12. Are you willing to have a blood transfusion if it is medically needed before, during, or after your surgery? Yes   13. Have you or any of your relatives ever had problems with anesthesia? No   14. Do you have sleep apnea, excessive snoring or daytime drowsiness? No   15. Do you have any artifical heart valves or other implanted medical devices like a pacemaker, defibrillator, or continuous glucose monitor? No   16. Do you have artificial joints? No   17. Are you allergic to latex? No       Health Care Directive:  Patient does not have a Health Care Directive or Living Will:  Discussed advance care planning with patient; however, patient declined at this time.    Preoperative Review of :   reviewed - no record of controlled substances prescribed.    Review of Systems    Patient Active Problem List    Diagnosis Date Noted     Primary hypertension 03/07/2022     Priority: Medium     Preoperative examination 02/28/2022     Priority: Medium     Right inguinal hernia 02/28/2022     Priority: Medium     Incomplete right bundle branch block 02/28/2022     Priority: Medium     CARDIOVASCULAR SCREENING; LDL GOAL LESS THAN 160 02/27/2013     Priority: Medium      Past Medical History:   Diagnosis Date     CARDIOVASCULAR SCREENING; LDL GOAL LESS THAN 160      NO ACTIVE PROBLEMS      Past Surgical History:   Procedure Laterality Date     HC OR OCULAR DEVICE INTRAOP DETACHED RETINA  2003    Detached Retina Repair - Right Eye     Current Outpatient Medications   Medication Sig Dispense Refill     amLODIPine (NORVASC) 10 MG tablet Take 1 tablet (10 mg) by mouth in the morning. 90 tablet 3       No Known Allergies     Social History     Tobacco Use     Smoking status: Never Smoker     Smokeless tobacco: Never Used   Substance Use Topics     Alcohol use: Yes     Comment: Socially - 5 to 10 drinks a week     History   Drug Use No         Objective     There were no vitals taken for this visit.    Physical Exam  Vitals reviewed.   HENT:      Mouth/Throat:      Mouth: Mucous membranes are moist.      Pharynx: Oropharynx is clear. No oropharyngeal exudate or posterior oropharyngeal erythema.   Cardiovascular:      Rate and Rhythm: Normal rate and regular rhythm.      Heart sounds: Normal heart sounds. No murmur heard.    No gallop.   Pulmonary:      Effort: Pulmonary effort is normal. No respiratory distress.      Breath sounds: Normal breath sounds. No stridor. No wheezing, rhonchi or rales.   Neurological:      Mental Status: He is alert.       Recent Labs   Lab Test 02/28/22  1003   HGB 17.8*   PLT  224      POTASSIUM 4.4   CR 0.90        Diagnostics:  Labs pending at this time.  Results will be reviewed when available.   No EKG required, no history of coronary heart disease, significant arrhythmia, peripheral arterial disease or other structural heart disease.    Revised Cardiac Risk Index (RCRI):  The patient has the following serious cardiovascular risks for perioperative complications:   - No serious cardiac risks = 0 points     RCRI Interpretation: 0 points: Class I (very low risk - 0.4% complication rate)       Signed Electronically by: JEAN GONZALEZ MD  Copy of this evaluation report is provided to requesting physician.

## 2022-05-13 ENCOUNTER — LAB (OUTPATIENT)
Dept: URGENT CARE | Facility: URGENT CARE | Age: 37
End: 2022-05-13
Payer: COMMERCIAL

## 2022-05-13 DIAGNOSIS — Z11.59 ENCOUNTER FOR SCREENING FOR OTHER VIRAL DISEASES: ICD-10-CM

## 2022-05-13 PROCEDURE — U0003 INFECTIOUS AGENT DETECTION BY NUCLEIC ACID (DNA OR RNA); SEVERE ACUTE RESPIRATORY SYNDROME CORONAVIRUS 2 (SARS-COV-2) (CORONAVIRUS DISEASE [COVID-19]), AMPLIFIED PROBE TECHNIQUE, MAKING USE OF HIGH THROUGHPUT TECHNOLOGIES AS DESCRIBED BY CMS-2020-01-R: HCPCS

## 2022-05-13 PROCEDURE — U0005 INFEC AGEN DETEC AMPLI PROBE: HCPCS

## 2022-05-14 LAB — SARS-COV-2 RNA RESP QL NAA+PROBE: NEGATIVE

## 2022-05-15 ENCOUNTER — ANESTHESIA EVENT (OUTPATIENT)
Dept: SURGERY | Facility: CLINIC | Age: 37
End: 2022-05-15
Payer: COMMERCIAL

## 2022-05-15 ASSESSMENT — LIFESTYLE VARIABLES: TOBACCO_USE: 0

## 2022-05-15 ASSESSMENT — COPD QUESTIONNAIRES: COPD: 0

## 2022-05-15 NOTE — ANESTHESIA PREPROCEDURE EVALUATION
Anesthesia Pre-Procedure Evaluation    Patient: Femi Soni   MRN: [id number] : 1985        Procedure : Procedure(s):  ROBOT-ASSISTED RIGHT INGUINAL HERNIA REPAIR          Past Medical History:   Diagnosis Date     CARDIOVASCULAR SCREENING; LDL GOAL LESS THAN 160      NO ACTIVE PROBLEMS       Past Surgical History:   Procedure Laterality Date     HC OR OCULAR DEVICE INTRAOP DETACHED RETINA      Detached Retina Repair - Right Eye      No Known Allergies   Social History     Tobacco Use     Smoking status: Never Smoker     Smokeless tobacco: Never Used   Substance Use Topics     Alcohol use: Yes     Comment: Socially - 5 to 10 drinks a week      Wt Readings from Last 1 Encounters:   22 104.8 kg (231 lb)        Anesthesia Evaluation            ROS/MED HX  ENT/Pulmonary:    (-) tobacco use, asthma, COPD and sleep apnea   Neurologic:  - neg neurologic ROS     Cardiovascular:     (+) hypertension----- (-) CAD   METS/Exercise Tolerance: >4 METS    Hematologic:       Musculoskeletal:       GI/Hepatic: Comment: [facility]      Renal/Genitourinary:  - neg Renal ROS     Endo:  - neg endo ROS     Psychiatric/Substance Use:     (+) psychiatric history anxiety     Infectious Disease:       Malignancy:  - neg malignancy ROS     Other:            Physical Exam    Airway        Mallampati: II   TM distance: > 3 FB   Neck ROM: full   Mouth opening: > 3 cm    Respiratory Devices and Support         Dental  no notable dental history         Cardiovascular   cardiovascular exam normal       Rhythm and rate: regular     Pulmonary           breath sounds clear to auscultation           OUTSIDE LABS:  CBC:   Lab Results   Component Value Date    WBC 5.7 2022    HGB 17.8 (H) 2022    HCT 51.6 2022     2022     BMP:   Lab Results   Component Value Date     2022    POTASSIUM 4.4 2022    CHLORIDE 105 2022    CO2 25 2022    BUN 11 2022    CR 0.90 2022    GLC  105 (H) 02/28/2022     COAGS: No results found for: PTT, INR, FIBR  POC: No results found for: BGM, HCG, HCGS  HEPATIC: No results found for: ALBUMIN, PROTTOTAL, ALT, AST, GGT, ALKPHOS, BILITOTAL, BILIDIRECT, EMELY  OTHER:   Lab Results   Component Value Date    TAN 9.7 02/28/2022       Anesthesia Plan    ASA Status:  2   NPO Status:  NPO Appropriate    Anesthesia Type: General.     - Airway: ETT   Induction: Intravenous, Propofol.   Maintenance: Balanced.        Consents    Anesthesia Plan(s) and associated risks, benefits, and realistic alternatives discussed. Questions answered and patient/representative(s) expressed understanding.    - Discussed:     - Discussed with:  Patient      - Extended Intubation/Ventilatory Support Discussed: No.      - Patient is DNR/DNI Status: No    Use of blood products discussed: No .     Postoperative Care    Pain management: Multi-modal analgesia.   PONV prophylaxis: Ondansetron (or other 5HT-3), Dexamethasone or Solumedrol     Comments:    Other Comments: Patient is counseled on the anesthesia plan and relevant anesthesia procedures including all risks and benefits. All patient questions were answered.             Conor Turk MD

## 2022-05-16 ENCOUNTER — ANESTHESIA (OUTPATIENT)
Dept: SURGERY | Facility: CLINIC | Age: 37
End: 2022-05-16
Payer: COMMERCIAL

## 2022-05-16 ENCOUNTER — APPOINTMENT (OUTPATIENT)
Dept: SURGERY | Facility: PHYSICIAN GROUP | Age: 37
End: 2022-05-16
Payer: COMMERCIAL

## 2022-05-16 ENCOUNTER — HOSPITAL ENCOUNTER (OUTPATIENT)
Facility: CLINIC | Age: 37
Discharge: HOME | End: 2022-05-16
Attending: SURGERY | Admitting: SURGERY
Payer: COMMERCIAL

## 2022-05-16 VITALS
RESPIRATION RATE: 15 BRPM | TEMPERATURE: 96.7 F | SYSTOLIC BLOOD PRESSURE: 115 MMHG | BODY MASS INDEX: 27.95 KG/M2 | DIASTOLIC BLOOD PRESSURE: 73 MMHG | WEIGHT: 229.5 LBS | HEART RATE: 70 BPM | OXYGEN SATURATION: 98 % | HEIGHT: 76 IN

## 2022-05-16 DIAGNOSIS — K40.90 RIGHT INGUINAL HERNIA: Primary | ICD-10-CM

## 2022-05-16 PROCEDURE — 250N000025 HC SEVOFLURANE, PER MIN: Performed by: SURGERY

## 2022-05-16 PROCEDURE — 258N000003 HC RX IP 258 OP 636: Performed by: ANESTHESIOLOGY

## 2022-05-16 PROCEDURE — 999N000141 HC STATISTIC PRE-PROCEDURE NURSING ASSESSMENT: Performed by: SURGERY

## 2022-05-16 PROCEDURE — 250N000009 HC RX 250: Performed by: NURSE ANESTHETIST, CERTIFIED REGISTERED

## 2022-05-16 PROCEDURE — 370N000017 HC ANESTHESIA TECHNICAL FEE, PER MIN: Performed by: SURGERY

## 2022-05-16 PROCEDURE — 49650 LAP ING HERNIA REPAIR INIT: CPT | Mod: 50 | Performed by: SURGERY

## 2022-05-16 PROCEDURE — 710N000009 HC RECOVERY PHASE 1, LEVEL 1, PER MIN: Performed by: SURGERY

## 2022-05-16 PROCEDURE — 250N000013 HC RX MED GY IP 250 OP 250 PS 637: Performed by: PHYSICIAN ASSISTANT

## 2022-05-16 PROCEDURE — 250N000011 HC RX IP 250 OP 636: Performed by: NURSE ANESTHETIST, CERTIFIED REGISTERED

## 2022-05-16 PROCEDURE — 258N000003 HC RX IP 258 OP 636: Performed by: NURSE ANESTHETIST, CERTIFIED REGISTERED

## 2022-05-16 PROCEDURE — C1781 MESH (IMPLANTABLE): HCPCS | Performed by: SURGERY

## 2022-05-16 PROCEDURE — 250N000011 HC RX IP 250 OP 636: Performed by: ANESTHESIOLOGY

## 2022-05-16 PROCEDURE — 250N000009 HC RX 250: Performed by: SURGERY

## 2022-05-16 PROCEDURE — 272N000001 HC OR GENERAL SUPPLY STERILE: Performed by: SURGERY

## 2022-05-16 PROCEDURE — 710N000012 HC RECOVERY PHASE 2, PER MINUTE: Performed by: SURGERY

## 2022-05-16 PROCEDURE — 250N000011 HC RX IP 250 OP 636: Performed by: SURGERY

## 2022-05-16 PROCEDURE — 360N000080 HC SURGERY LEVEL 7, PER MIN: Performed by: SURGERY

## 2022-05-16 DEVICE — MESH PROGRIP LAPAROSCOPIC 5.9X3.9" PARIETEX SELF-FIX LPG1510: Type: IMPLANTABLE DEVICE | Site: INGUINAL | Status: FUNCTIONAL

## 2022-05-16 RX ORDER — LIDOCAINE HYDROCHLORIDE 20 MG/ML
INJECTION, SOLUTION INFILTRATION; PERINEURAL PRN
Status: DISCONTINUED | OUTPATIENT
Start: 2022-05-16 | End: 2022-05-16

## 2022-05-16 RX ORDER — ONDANSETRON 2 MG/ML
4 INJECTION INTRAMUSCULAR; INTRAVENOUS EVERY 30 MIN PRN
Status: DISCONTINUED | OUTPATIENT
Start: 2022-05-16 | End: 2022-05-16 | Stop reason: HOSPADM

## 2022-05-16 RX ORDER — OXYCODONE HYDROCHLORIDE 5 MG/1
5-10 TABLET ORAL EVERY 4 HOURS PRN
Qty: 15 TABLET | Refills: 0 | Status: SHIPPED | OUTPATIENT
Start: 2022-05-16 | End: 2023-06-08

## 2022-05-16 RX ORDER — HYDROMORPHONE HCL IN WATER/PF 6 MG/30 ML
0.2 PATIENT CONTROLLED ANALGESIA SYRINGE INTRAVENOUS EVERY 5 MIN PRN
Status: DISCONTINUED | OUTPATIENT
Start: 2022-05-16 | End: 2022-05-16 | Stop reason: HOSPADM

## 2022-05-16 RX ORDER — NEOSTIGMINE METHYLSULFATE 1 MG/ML
VIAL (ML) INJECTION PRN
Status: DISCONTINUED | OUTPATIENT
Start: 2022-05-16 | End: 2022-05-16

## 2022-05-16 RX ORDER — CEFAZOLIN SODIUM/WATER 2 G/20 ML
2 SYRINGE (ML) INTRAVENOUS SEE ADMIN INSTRUCTIONS
Status: DISCONTINUED | OUTPATIENT
Start: 2022-05-16 | End: 2022-05-16 | Stop reason: HOSPADM

## 2022-05-16 RX ORDER — MAGNESIUM HYDROXIDE 1200 MG/15ML
LIQUID ORAL PRN
Status: DISCONTINUED | OUTPATIENT
Start: 2022-05-16 | End: 2022-05-16 | Stop reason: HOSPADM

## 2022-05-16 RX ORDER — KETOROLAC TROMETHAMINE 30 MG/ML
INJECTION, SOLUTION INTRAMUSCULAR; INTRAVENOUS PRN
Status: DISCONTINUED | OUTPATIENT
Start: 2022-05-16 | End: 2022-05-16

## 2022-05-16 RX ORDER — GLYCOPYRROLATE 0.2 MG/ML
INJECTION, SOLUTION INTRAMUSCULAR; INTRAVENOUS PRN
Status: DISCONTINUED | OUTPATIENT
Start: 2022-05-16 | End: 2022-05-16

## 2022-05-16 RX ORDER — CEFAZOLIN SODIUM/WATER 2 G/20 ML
2 SYRINGE (ML) INTRAVENOUS
Status: COMPLETED | OUTPATIENT
Start: 2022-05-16 | End: 2022-05-16

## 2022-05-16 RX ORDER — PROPOFOL 10 MG/ML
INJECTION, EMULSION INTRAVENOUS PRN
Status: DISCONTINUED | OUTPATIENT
Start: 2022-05-16 | End: 2022-05-16

## 2022-05-16 RX ORDER — BUPIVACAINE HYDROCHLORIDE AND EPINEPHRINE 2.5; 5 MG/ML; UG/ML
INJECTION, SOLUTION INFILTRATION; PERINEURAL PRN
Status: DISCONTINUED | OUTPATIENT
Start: 2022-05-16 | End: 2022-05-16 | Stop reason: HOSPADM

## 2022-05-16 RX ORDER — FENTANYL CITRATE 50 UG/ML
INJECTION, SOLUTION INTRAMUSCULAR; INTRAVENOUS PRN
Status: DISCONTINUED | OUTPATIENT
Start: 2022-05-16 | End: 2022-05-16

## 2022-05-16 RX ORDER — OXYCODONE HYDROCHLORIDE 5 MG/1
5 TABLET ORAL EVERY 4 HOURS PRN
Status: DISCONTINUED | OUTPATIENT
Start: 2022-05-16 | End: 2022-05-16 | Stop reason: HOSPADM

## 2022-05-16 RX ORDER — OXYCODONE HYDROCHLORIDE 5 MG/1
5-10 TABLET ORAL
Status: COMPLETED | OUTPATIENT
Start: 2022-05-16 | End: 2022-05-16

## 2022-05-16 RX ORDER — SODIUM CHLORIDE, SODIUM LACTATE, POTASSIUM CHLORIDE, CALCIUM CHLORIDE 600; 310; 30; 20 MG/100ML; MG/100ML; MG/100ML; MG/100ML
INJECTION, SOLUTION INTRAVENOUS CONTINUOUS
Status: DISCONTINUED | OUTPATIENT
Start: 2022-05-16 | End: 2022-05-16 | Stop reason: HOSPADM

## 2022-05-16 RX ORDER — DEXAMETHASONE SODIUM PHOSPHATE 4 MG/ML
INJECTION, SOLUTION INTRA-ARTICULAR; INTRALESIONAL; INTRAMUSCULAR; INTRAVENOUS; SOFT TISSUE PRN
Status: DISCONTINUED | OUTPATIENT
Start: 2022-05-16 | End: 2022-05-16

## 2022-05-16 RX ORDER — IBUPROFEN 800 MG/1
800 TABLET, FILM COATED ORAL EVERY 8 HOURS PRN
Qty: 30 TABLET | Refills: 0 | Status: SHIPPED | OUTPATIENT
Start: 2022-05-16 | End: 2023-06-08

## 2022-05-16 RX ORDER — FENTANYL CITRATE 0.05 MG/ML
25 INJECTION, SOLUTION INTRAMUSCULAR; INTRAVENOUS EVERY 5 MIN PRN
Status: DISCONTINUED | OUTPATIENT
Start: 2022-05-16 | End: 2022-05-16 | Stop reason: HOSPADM

## 2022-05-16 RX ORDER — AMOXICILLIN 250 MG
1-2 CAPSULE ORAL 2 TIMES DAILY PRN
Qty: 15 TABLET | Refills: 0 | Status: SHIPPED | OUTPATIENT
Start: 2022-05-16 | End: 2023-06-08

## 2022-05-16 RX ORDER — ONDANSETRON 2 MG/ML
INJECTION INTRAMUSCULAR; INTRAVENOUS PRN
Status: DISCONTINUED | OUTPATIENT
Start: 2022-05-16 | End: 2022-05-16

## 2022-05-16 RX ORDER — ACETAMINOPHEN 325 MG/1
650 TABLET ORAL EVERY 6 HOURS PRN
COMMUNITY
Start: 2022-05-16 | End: 2023-06-08

## 2022-05-16 RX ORDER — ONDANSETRON 4 MG/1
4 TABLET, ORALLY DISINTEGRATING ORAL EVERY 30 MIN PRN
Status: DISCONTINUED | OUTPATIENT
Start: 2022-05-16 | End: 2022-05-16 | Stop reason: HOSPADM

## 2022-05-16 RX ADMIN — LIDOCAINE HYDROCHLORIDE 100 MG: 20 INJECTION, SOLUTION INFILTRATION; PERINEURAL at 09:48

## 2022-05-16 RX ADMIN — FENTANYL CITRATE 25 MCG: 50 INJECTION, SOLUTION INTRAMUSCULAR; INTRAVENOUS at 11:20

## 2022-05-16 RX ADMIN — FENTANYL CITRATE 25 MCG: 50 INJECTION, SOLUTION INTRAMUSCULAR; INTRAVENOUS at 09:48

## 2022-05-16 RX ADMIN — FENTANYL CITRATE 25 MCG: 50 INJECTION, SOLUTION INTRAMUSCULAR; INTRAVENOUS at 12:04

## 2022-05-16 RX ADMIN — FENTANYL CITRATE 50 MCG: 50 INJECTION, SOLUTION INTRAMUSCULAR; INTRAVENOUS at 10:01

## 2022-05-16 RX ADMIN — GLYCOPYRROLATE 0.8 MG: 0.2 INJECTION, SOLUTION INTRAMUSCULAR; INTRAVENOUS at 10:45

## 2022-05-16 RX ADMIN — ONDANSETRON 4 MG: 2 INJECTION INTRAMUSCULAR; INTRAVENOUS at 09:48

## 2022-05-16 RX ADMIN — KETOROLAC TROMETHAMINE 30 MG: 30 INJECTION, SOLUTION INTRAMUSCULAR at 10:45

## 2022-05-16 RX ADMIN — MIDAZOLAM 2 MG: 1 INJECTION INTRAMUSCULAR; INTRAVENOUS at 09:44

## 2022-05-16 RX ADMIN — SODIUM CHLORIDE, POTASSIUM CHLORIDE, SODIUM LACTATE AND CALCIUM CHLORIDE: 600; 310; 30; 20 INJECTION, SOLUTION INTRAVENOUS at 10:49

## 2022-05-16 RX ADMIN — NEOSTIGMINE METHYLSULFATE 5 MG: 1 INJECTION, SOLUTION INTRAVENOUS at 10:45

## 2022-05-16 RX ADMIN — FENTANYL CITRATE 25 MCG: 50 INJECTION, SOLUTION INTRAMUSCULAR; INTRAVENOUS at 10:25

## 2022-05-16 RX ADMIN — PROPOFOL 300 MG: 10 INJECTION, EMULSION INTRAVENOUS at 09:48

## 2022-05-16 RX ADMIN — FENTANYL CITRATE 25 MCG: 50 INJECTION, SOLUTION INTRAMUSCULAR; INTRAVENOUS at 11:52

## 2022-05-16 RX ADMIN — ROCURONIUM BROMIDE 50 MG: 50 INJECTION, SOLUTION INTRAVENOUS at 09:48

## 2022-05-16 RX ADMIN — PHENYLEPHRINE HYDROCHLORIDE 50 MCG: 10 INJECTION INTRAVENOUS at 10:18

## 2022-05-16 RX ADMIN — DEXAMETHASONE SODIUM PHOSPHATE 8 MG: 4 INJECTION, SOLUTION INTRA-ARTICULAR; INTRALESIONAL; INTRAMUSCULAR; INTRAVENOUS; SOFT TISSUE at 09:48

## 2022-05-16 RX ADMIN — ROCURONIUM BROMIDE 10 MG: 50 INJECTION, SOLUTION INTRAVENOUS at 10:31

## 2022-05-16 RX ADMIN — SODIUM CHLORIDE, POTASSIUM CHLORIDE, SODIUM LACTATE AND CALCIUM CHLORIDE: 600; 310; 30; 20 INJECTION, SOLUTION INTRAVENOUS at 08:51

## 2022-05-16 RX ADMIN — OXYCODONE HYDROCHLORIDE 10 MG: 5 TABLET ORAL at 12:15

## 2022-05-16 RX ADMIN — FENTANYL CITRATE 25 MCG: 50 INJECTION, SOLUTION INTRAMUSCULAR; INTRAVENOUS at 11:38

## 2022-05-16 RX ADMIN — HYDROMORPHONE HYDROCHLORIDE 0.5 MG: 1 INJECTION, SOLUTION INTRAMUSCULAR; INTRAVENOUS; SUBCUTANEOUS at 10:46

## 2022-05-16 RX ADMIN — ROCURONIUM BROMIDE 10 MG: 50 INJECTION, SOLUTION INTRAVENOUS at 10:01

## 2022-05-16 RX ADMIN — Medication 2 G: at 09:38

## 2022-05-16 NOTE — ANESTHESIA POSTPROCEDURE EVALUATION
Patient: Femi Soni    Procedure: Procedure(s):  ROBOT-ASSISTED BILATERAL INGUINAL HERNIA REPAIR       Anesthesia Type:  General    Note:  Disposition: Outpatient   Postop Pain Control: Uneventful            Sign Out: Well controlled pain   PONV:    Neuro/Psych: Uneventful            Sign Out: Acceptable/Baseline neuro status   Airway/Respiratory: Uneventful            Sign Out: Acceptable/Baseline resp. status   CV/Hemodynamics: Uneventful            Sign Out: Acceptable CV status   Other NRE: NONE   DID A NON-ROUTINE EVENT OCCUR? No           Last vitals:  Vitals Value Taken Time   /83 05/16/22 1215   Temp 35.9  C (96.7  F) 05/16/22 1215   Pulse 78 05/16/22 1219   Resp 19 05/16/22 1219   SpO2 98 % 05/16/22 1219   Vitals shown include unvalidated device data.    Electronically Signed By: Conor Turk MD  May 16, 2022  3:45 PM

## 2022-05-16 NOTE — OP NOTE
General Surgery Operative Note    PREOPERATIVE DIAGNOSIS:  Right possible left inguinal hernia    POSTOPERATIVE DIAGNOSIS: Bilateral inguinal hernia    PROCEDURE:  Robotic assisted bilateral inguinal hernia repair with mesh    ANESTHESIA:  General.    SURGEON:  Deo Craig M.D.    ASSISTANT:   Joe Fallon [facility] Resident    ESTIMATED BLOOD LOSS:  5 cc    INTRAOPERATIVE FINDINGS:  Right- Large indirect. Left- Small indirect    OPERATIVE INDICATIONS: Mr. Soni has a symptomatic right possible left inguinal hernias. Options were discussed and it was elected to proceed with robotic assisted repair. Potential risks of bleeding, infection, neurovascular injury to the vas deferens or testicle, recurrent hernia, chronic pain were all reviewed in detail and he wished to proceed.     DESCRIPTION OF PROCEDURE: The patient was taken to the operating suite and uneventfully endotracheally intubated. The abdomen and groin were prepped and draped in the usual sterile fashion. Surgeon initiated timeout was performed verifying the correct patient, procedure, site, and surgeon. All in the room were in agreement.  A 8 mm incision was made above the umbilicus.  The varies needle was carefully inserted using the drop test without difficulty.  The abdomen was insufflated to a pressure of 15 which the patient tolerated well.  The camera was inserted and revealed no injuries from trocar placement.  Two 8 mm trocars were placed on the lateral abdominal wall under direct visualization.  The robot was docked in the usual fashion.  A  forcep and scissors were placed.  We began our dissection on the right side.  Cautery dissection was used to open the peritoneal plane.  Using combination of sharp and blunt dissection, a plane was created behind the abdominal wall and the underlying peritoneum. This was done in a blunt and atraumatic fashion. The inferior epigastric vessels were visualized running along the underside of the abdominal wall.   The epigastric vessels were followed down until we were able to identify the internal ring. The spermatic cord was then meticulously dissected preserving all of the nerve and blood vessels. A  large Indirect hernia was found and reduced without difficulty. Coopers ligament was then cleared without significant bleeding. The dissection was continued until we had an excellent space in the preperitoneal area.  A piece of progrip was then placed within this space with good coverage over the entire hernia. Attention was then turned to the opposite side.  Cautery was used to open the peritoneal space.  Blunt and cautery dissection was used to open the preperitoneal plane.  The spermatic cord was meticulously dissected preserving all of the nerve and blood vessels. A  small Indirect hernia was found and reduced without difficulty. Coopers ligament was then cleared without significant bleeding. The dissection was continued until we had an excellent space in the preperitoneal area.  A piece of progrip was then placed within this space with good coverage over the entire hernia.  The peritoneum was closed on both sides with a running V lock suture.  There were no holes or defects within the peritoneum.  Final inspection revealed no bleeding or other abnormalities.  All trocars were removed under direct visualization. Marcaine was once again injected to the devante-wound area. The incisions were completely dry without any bleeding. The skin was closed with a 4.0 Monocryl in a subcuticular fashion. Steri-Strips were applied. All Needle and sponge counts were correct.  A debriefing was performed verifying the correct procedure, sponge/instrument count, specimen identification, and blood loss. The patient tolerated the procedure well and was taken to the recovery room in stable condition. The physician assistant was medically necessary to assist in prepping, positioning, camera operation, retraction/exposure, and closure of the port  sites.       Deo Craig M.D.    Please cc note to referring provider and PCP

## 2022-05-16 NOTE — DISCHARGE INSTRUCTIONS
Today you received Toradol, an antiinflammatory medication similar to Ibuprofen.  You should not take other antiinflammatory medication, such as Ibuprofen, Motrin, Advil, Aleve, Naprosyn, etc until 4:45  pm.          [facility]  Discharge Instructions: Post-Operative Robotic Inguinal Hernia Repair    ACTIVITY  Take frequent, short walks and increase your activity gradually.    Avoid strenuous physical activity or heavy lifting greater than 15 lbs. for 2-3 weeks.  You may climb stairs.  You may drive without restrictions when you are not using any prescription pain medication and feel comfortable in a car.  You may return to work/school when you are comfortable without any prescription pain medication.    WOUND CARE  You may remove your outer dressing or Band-Aids and shower 48 hours after the surgery.  Pat your incisions dry and leave them open to air.  Re-apply dressing (Band-Aids or gauze/tape) as needed for comfort or drainage.  You may have steri-strips (looks like white tape) on your incision.  You may peel off the steri-strips 2 weeks after your surgery if they have not peeled off on their own.   Do not soak your incisions in a tub or pool for 2 weeks.   Do not apply any lotions, creams, or ointments to your incisions.  A ridge under your incisions is normal and will gradually resolve.    DIET  Start with liquids, then gradually resume your regular diet as tolerated.   Drink plenty of fluids to stay hydrated.    PAIN  Expect some tenderness and discomfort at the incision site(s).  Use the prescribed pain medication at your discretion.  Expect gradual resolution of your pain over several days.  You may take ibuprofen with food (unless you have been told not to) or acetaminophen/Tylenol instead of or in addition to your prescribed pain medication.    Do not drink alcohol or drive while you are taking pain medications.  You may apply ice to your incisions in 20 minute intervals as  needed for the next 48 hours.  After that time, consider switching to heat if you prefer.    EXPECTATIONS  You may notice air in your scrotum and/or penis after the surgery.  This is due to the gas used during the surgery.  You can expect some swelling and bruising involving the scrotum and/or penis as well as numbness.  These symptoms are expected and should gradually resolve in the next few days.  You may use ice to help with the swelling and try placing a rolled hand towel below your scrotum to help alleviate scrotal discomfort.  If you develop significant testicular or penile pain, please call our office and speak with a nurse.  Pain medications can cause constipation.  Limit use when possible.  Take the prescribed Senna-Docusate 1-2 times a day with plenty of water.  You may also take a mild over the counter laxative, such as Miralax or a suppository, as needed.  You may take 1 oz. (2 tablespoons) Milk of Magnesia the evening following surgery to encourage bowel movement.  You may discontinue these medications once you are having regular bowel movements and/or are no longer taking your narcotic pain medication.   You may have shoulder or upper back discomfort due to the gas used in surgery.  This is temporary and should resolve in 48-72 hours.  Short, frequent walks may help with this.  If you are unable to urinate, or feel as though you are not emptying your bladder adequately, we recommend you call our office and/or seek care at an ER or Urgent Care facility if after hours.    FOLLOW UP  Our office will contact you in approximately 2 weeks to check on your progress and answer any questions you may have.  If you are doing well, you will not need to return for a follow up appointment.  If any concerns are identified over the phone, we will help you make an appointment to see a provider.   If you have not received a phone call, have any questions or concerns, or would like to be seen, please call us at  [phone] and ask to speak with our nurse.  We are located at [address] [place] [address] [address]  [place] [address].    CALL OUR OFFICE -[phone] IF YOU HAVE:   Chills or fever above 101 F.  Increased redness, warmth, or drainage at your incisions.  Significant bleeding.  Pain not relieved by your pain medication or rest.  Increasing pain after the first 48 hours.  Any other concerns or questions.       Revised September 2020     Same Day Surgery Discharge Instructions for  Sedation and General Anesthesia     It's not unusual to feel dizzy, light-headed or faint for up to 24 hours after surgery or while taking pain medication.  If you have these symptoms: sit for a few minutes before standing and have someone assist you when you get up to walk or use the bathroom.    You should rest and relax for the next 24 hours. We recommend you make arrangements to have an adult stay with you for at least 24 hours after your discharge.  Avoid hazardous and strenuous activity.    DO NOT DRIVE any vehicle or operate mechanical equipment for 24 hours following the end of your surgery.  Even though you may feel normal, your reactions may be affected by the medication you have received.    Do not drink alcoholic beverages for 24 hours following surgery.     Slowly progress to your regular diet as you feel able. It's not unusual to feel nauseated and/or vomit after receiving anesthesia.  If you develop these symptoms, drink clear liquids (apple juice, ginger ale, broth, 7-up, etc. ) until you feel better.  If your nausea and vomiting persists for 24 hours, please notify your surgeon.      All narcotic pain medications, along with inactivity and anesthesia, can cause constipation. Drinking plenty of liquids and increasing fiber intake will help.    For any questions of a medical nature, call your surgeon.    Do not make important decisions for 24 hours.    If you had general anesthesia, you may have a sore throat for a couple of  days related to the breathing tube used during surgery.  You may use Cepacol lozenges to help with this discomfort.  If it worsens or if you develop a fever, contact your surgeon.     If you feel your pain is not well managed with the pain medications prescribed by your surgeon, please contact your surgeon's office to let them know so they can address your concerns.       CoVid 19 Information    We want to give you information regarding Covid. Please consult your primary care provider with any questions you might have.     Patient who have symptoms (cough, fever, or shortness of breath), need to isolate for 7 days from when symptoms started OR 72 hours after fever resolves (without fever reducing medications) AND improvement of respiratory symptoms (whichever is longer).    Isolate yourself at home (in own room/own bathroom if possible)  Do Not allow any visitors  Do Not go to work or school  Do Not go to [employer],  centers, shopping, or other public places.  Do Not shake hands.  Avoid close and intimate contact with others (hugging, kissing).  Follow CDC recommendations for household cleaning of frequently touched services.     After the initial 7 days, continue to isolate yourself from household members as much as possible. To continue decrease the risk of community spread and exposure, you and any members of your household should limit activities in public for 14 days after starting home isolation.     You can reference the following CDC link for helpful home isolation/care tips:  https://www.cdc.gov/coronavirus/2019-ncov/downloads/10Things.pdf    Protect Others:  Cover Your Mouth and Nose with a mask, disposable tissue or wash cloth to avoid spreading germs to others.  Wash your hands and face frequently with soap and water    Call Your Primary Doctor If: Breathing difficulty develops or you become worse.    For more information about COVID19 and options for caring for yourself at home, please visit the  CDC website at https://www.cdc.gov/coronavirus/2019-ncov/about/steps-when-sick.html  For more options for care at [facility], please visit our website at [email or web address]         CALL OUR OFFICE -[phone] IF YOU HAVE any questions or concerns

## 2022-05-16 NOTE — ANESTHESIA PROCEDURE NOTES
Airway       Patient location during procedure: OR       Procedure Start/Stop Times: 5/16/2022 9:51 AM  Staff -        Anesthesiologist:  Conor Turk MD       CRNA: Natalia Orozco APRN CRNA       Performed By: CRNA  Consent for Airway        Urgency: elective  Indications and Patient Condition       Indications for airway management: devante-procedural       Induction type:intravenous       Mask difficulty assessment: 1 - vent by mask    Final Airway Details       Final airway type: endotracheal airway       Successful airway: ETT - single  Endotracheal Airway Details        ETT size (mm): 8.0       Cuffed: yes       Successful intubation technique: direct laryngoscopy       DL Blade Type: Grove 2       Grade View of Cords: 1       Adjucts: stylet       Position: Right       Measured from: gums/teeth       Secured at (cm): 24       Bite block used: None    Post intubation assessment        Placement verified by: capnometry, equal breath sounds and chest rise        Number of attempts at approach: 1       Number of other approaches attempted: 0       Secured with: silk tape       Ease of procedure: easy       Dentition: Unchanged    Medication(s) Administered   Medication Administration Time: 5/16/2022 9:51 AM

## 2022-06-23 NOTE — PROGRESS NOTES
Surgery Postoperative Note    S: Femi is a 37-year-old male who recently underwent robotic repair of bilateral inguinal hernias (large right indirect and small left indirect) on 5/16/2022.  The patient reports that he was initially doing well postoperatively.  He then began to increase his activity level.  Approximately 2 weeks ago, the patient began noticing intermittent discomfort in the right periumbilical/lower quadrant area, between his surgical incisions and his inguinal area.  He has not noticed any associated bulging.  He describes the pain as sharp.  The pain is more pronounced when he is standing or active.  Does not have any associated nausea or vomiting.    Abdomen: Soft, nondistended, no particular tenderness with palpation, no evidence of recurrent inguinal hernia bilaterally; incisions healing well without evidence of infection    A/P  Femi Soni is recovering from robotic repair of bilateral inguinal hernias.  At this time, I believe the patient's symptoms are musculoskeletal in nature and related to his increased activity level and do not represent any significant underlying surgical complication.  I have encouraged the patient to continue to remain active.  If he is having discomfort, I instructed him to apply either heat or ice to the area of discomfort and take ibuprofen.  The patient should continue to monitor his symptoms over the next couple of weeks.  If he is not noticing improvement in his symptoms, he was instructed to contact my clinic directly.  We would then consider proceeding with CT of the abdomen/pelvis.  Patient is in agreement with this plan.    Thank you for the opportunity to help in his care.    Merced Villa MD   [facility]  [phone]    Please route or send letter to:  Primary Care Provider (PCP) and Referring Provider

## 2022-06-24 ENCOUNTER — OFFICE VISIT (OUTPATIENT)
Dept: SURGERY | Facility: CLINIC | Age: 37
End: 2022-06-24
Payer: COMMERCIAL

## 2022-06-24 DIAGNOSIS — G89.18 POSTOPERATIVE PAIN: Primary | ICD-10-CM

## 2022-06-24 PROCEDURE — 99024 POSTOP FOLLOW-UP VISIT: CPT | Performed by: SURGERY

## 2022-06-24 NOTE — LETTER
June 24, 2022          Adela Cook MD  [address] [place] [address]      RE:   Femi Soni 1985      Dear Colleague,    Thank you for referring your patient, Femi Soni, to [facility] at [facility]. Please see a copy of my visit note below.     Femi is a 37-year-old male who recently underwent robotic repair of bilateral inguinal hernias (large right indirect and small left indirect) on 5/16/2022.  The patient reports that he was initially doing well postoperatively.  He then began to increase his activity level.  Approximately 2 weeks ago, the patient began noticing intermittent discomfort in the right periumbilical/lower quadrant area, between his surgical incisions and his inguinal area.  He has not noticed any associated bulging.  He describes the pain as sharp.  The pain is more pronounced when he is standing or active.  Does not have any associated nausea or vomiting.     Abdomen: Soft, nondistended, no particular tenderness with palpation, no evidence of recurrent inguinal hernia bilaterally; incisions healing well without evidence of infection     A/P  Femi Soni is recovering from robotic repair of bilateral inguinal hernias.  At this time, I believe the patient's symptoms are musculoskeletal in nature and related to his increased activity level and do not represent any significant underlying surgical complication.  I have encouraged the patient to continue to remain active.  If he is having discomfort, I instructed him to apply either heat or ice to the area of discomfort and take ibuprofen.  The patient should continue to monitor his symptoms over the next couple of weeks.  If he is not noticing improvement in his symptoms, he was instructed to contact my clinic directly.  We would then consider proceeding with CT of the abdomen/pelvis.  Patient is in agreement with this plan.       Again, thank you for allowing me to participate in the care of your  patient.      Sincerely,      Merced Villa MD

## 2022-09-04 ENCOUNTER — HEALTH MAINTENANCE LETTER (OUTPATIENT)
Age: 37
End: 2022-09-04

## 2023-04-29 ENCOUNTER — HEALTH MAINTENANCE LETTER (OUTPATIENT)
Age: 38
End: 2023-04-29

## 2023-06-08 ENCOUNTER — VIRTUAL VISIT (OUTPATIENT)
Dept: FAMILY MEDICINE | Facility: CLINIC | Age: 38
End: 2023-06-08
Payer: COMMERCIAL

## 2023-06-08 DIAGNOSIS — I10 PRIMARY HYPERTENSION: Primary | ICD-10-CM

## 2023-06-08 DIAGNOSIS — Z13.6 CARDIOVASCULAR SCREENING; LDL GOAL LESS THAN 160: ICD-10-CM

## 2023-06-08 DIAGNOSIS — R09.81 NASAL CONGESTION: ICD-10-CM

## 2023-06-08 PROCEDURE — 99214 OFFICE O/P EST MOD 30 MIN: CPT | Mod: VID | Performed by: STUDENT IN AN ORGANIZED HEALTH CARE EDUCATION/TRAINING PROGRAM

## 2023-06-08 RX ORDER — AMLODIPINE BESYLATE 10 MG/1
10 TABLET ORAL DAILY
Qty: 90 TABLET | Refills: 3 | Status: SHIPPED | OUTPATIENT
Start: 2023-06-08 | End: 2024-06-28

## 2023-06-08 NOTE — PROGRESS NOTES
Femi is a 38 year old who is being evaluated via a billable video visit.      How would you like to obtain your AVS? MyChart  If the video visit is dropped, the invitation should be resent by: Text to cell phone: [phone]  Will anyone else be joining your video visit? No          Assessment & Plan     Primary hypertension  Started on norvasc 1 year ago. Not checking BP regularly but since running out of rx recently his BP was 140/90 without medication. Will have him restart norvasc 10 mg.     Start tracking BP and keep log (a few times per week)   - send me a message in 4 weeks with BP readings.   - if BP adequately controlled then continue norvasc 10mg.  Goal BP <130/80  - if BP elevated we will add lisinopril     schedule lab only visit to check kidney function and cholesterol       - amLODIPine (NORVASC) 10 MG tablet; Take 1 tablet (10 mg) by mouth daily  - Basic metabolic panel  (Ca, Cl, CO2, Creat, Gluc, K, Na, BUN); Future    Nasal congestion  He has concern of deviated septum, chronic unilateral nasal congestion. Some snoring. Will refer to ENT for evaluation.   - Adult ENT  Referral; Future    CARDIOVASCULAR SCREENING; LDL GOAL LESS THAN 160  - Lipid panel reflex to direct LDL Fasting; Future    Ema Duong DO  [facility]   Femi is a 38 year old, presenting for the following health issues:  Hypertension        6/8/2023    10:26 AM   Additional Questions   Accompanied by na         6/8/2023    10:26 AM   Patient Reported Additional Medications   Patient reports taking the following new medications none     History of Present Illness       Hypertension: He presents for follow up of hypertension.  He does check blood pressure  regularly outside of the clinic. Outside blood pressures have been over 140/90. He follows a low salt diet.     He eats 4 or more servings of fruits and vegetables daily.He consumes 0 sweetened beverage(s) daily.He  exercises with enough effort to increase his heart rate 60 or more minutes per day.  He exercises with enough effort to increase his heart rate 6 days per week.   He is taking medications regularly.     HTN:   Dx last spring 2022 and given norvasc 5mg prior to surgery so then was increased to 10mg norvasc and lisinopril 5mg. Saw cardiology for a BP consult 4/12/2022 - stopped lisinopril 5mg and continue norvasc 10 since he was well controlled on this regimen. Cardiology ordered ECHO but didn't complete this.     Patients wants to know if this Is the preferred medication for him or discuss alternatives.    Had no side effects with lisinopril or norvasc.     Infrequently checks BP. Checked his BP recently and his BP was 140/90 yesterday but he had ran out of his BP medications 1 week ago.     Walks 4-5 miles per day. Lifts weights 3 times per week.     Denies CP, SOB, KRUGER, lightheadedness, pre-syncope. When weather was hot recently HR was racing.     MGM - cardiac issues late in life         He thinks he has a deviated septum. Can only breath out of one nostril at night. Thought was due to congestion but this has been chronic. Some snoring. Denies fatigue. No apnea.       Review of Systems   Constitutional, HEENT, cardiovascular, pulmonary, gi and gu systems are negative, except as otherwise noted.      Objective           Vitals:  No vitals were obtained today due to virtual visit.    Physical Exam   GENERAL: Healthy, alert and no distress  EYES: Eyes grossly normal to inspection.  No discharge or erythema, or obvious scleral/conjunctival abnormalities.  RESP: No audible wheeze, cough, or visible cyanosis.  No visible retractions or increased work of breathing.    SKIN: Visible skin clear. No significant rash, abnormal pigmentation or lesions.  NEURO: Cranial nerves grossly intact.  Mentation and speech appropriate for age.  PSYCH: Mentation appears normal, affect normal/bright, judgement and insight intact, normal  speech and appearance well-groomed.            Video-Visit Details    Type of service:  Video Visit     Originating Location (pt. Location): Home    Distant Location (provider location):  On-site  Platform used for Video Visit: Mark

## 2023-06-08 NOTE — PATIENT INSTRUCTIONS
Start tracking BP and keep log (a few times per week)   - send me a message in 4 weeks with BP readings.   - if BP adequately controlled then continue norvasc 10mg.  Goal BP <130/80  - if BP elevated we will add lisinopril     schedule lab only visit to check kidney function and cholesterol     Monitor salt in diet. Consider DASH diet. Continue exercising.

## 2023-06-12 NOTE — TELEPHONE ENCOUNTER
FUTURE VISIT INFORMATION      FUTURE VISIT INFORMATION:    Date: 7/7/23    Time: 12    Location: [facility]  REFERRAL INFORMATION:    Referring provider:  Ema Duong    Referring providers clinic:  [facility]     Reason for visit/diagnosis  R09.81 (ICD-10-CM) - Nasal congestion, Referral from Ema Duong, Referral notes in EPIC, Pt to be seen at [facility]    RECORDS REQUESTED FROM:       Clinic name Comments Records Status Imaging Status   [facility]  6/8/23- note with Ema Duong [facility] 1/15.15- note with Selwyn Mackay PA-C     Image:  2/12/15- MR [facility]  YASIR 6/12/23- pending req    -PACS            June 12, 2023 1:34 PM - [facility] a [facility] Image to [facility] P[facility]- Yareli   June 13, 2023 10:19 AM - Received image in pacs and attached it to patient- Yareli    spherecylinderaxisaddprismvertexVAInt VANVExaminer

## 2023-07-07 ENCOUNTER — PRE VISIT (OUTPATIENT)
Dept: OTOLARYNGOLOGY | Facility: CLINIC | Age: 38
End: 2023-07-07

## 2023-07-07 ENCOUNTER — OFFICE VISIT (OUTPATIENT)
Dept: OTOLARYNGOLOGY | Facility: CLINIC | Age: 38
End: 2023-07-07
Payer: COMMERCIAL

## 2023-07-07 VITALS
DIASTOLIC BLOOD PRESSURE: 100 MMHG | OXYGEN SATURATION: 98 % | WEIGHT: 235 LBS | HEIGHT: 75 IN | HEART RATE: 93 BPM | SYSTOLIC BLOOD PRESSURE: 153 MMHG | BODY MASS INDEX: 29.22 KG/M2

## 2023-07-07 DIAGNOSIS — R09.81 NASAL CONGESTION: Primary | ICD-10-CM

## 2023-07-07 DIAGNOSIS — J34.2 DEVIATED NASAL SEPTUM: ICD-10-CM

## 2023-07-07 DIAGNOSIS — R43.8 HYPOSMIA: ICD-10-CM

## 2023-07-07 DIAGNOSIS — J34.3 HYPERTROPHY OF INFERIOR NASAL TURBINATE: ICD-10-CM

## 2023-07-07 PROCEDURE — 31231 NASAL ENDOSCOPY DX: CPT | Performed by: OTOLARYNGOLOGY

## 2023-07-07 PROCEDURE — 99203 OFFICE O/P NEW LOW 30 MIN: CPT | Mod: 25 | Performed by: OTOLARYNGOLOGY

## 2023-07-07 RX ORDER — FLUTICASONE PROPIONATE 50 MCG
2 SPRAY, SUSPENSION (ML) NASAL DAILY
Qty: 9.9 ML | Refills: 3 | Status: SHIPPED | OUTPATIENT
Start: 2023-07-07 | End: 2023-12-18

## 2023-07-07 ASSESSMENT — PAIN SCALES - GENERAL: PAINLEVEL: NO PAIN (0)

## 2023-07-07 NOTE — PATIENT INSTRUCTIONS
You were seen in the ENT Clinic today by Dr. Frazier. If you have any questions or concerns after your appointment, please contact us (see below)       2.   The following recommendations have been made based upon your appointment today:   -Start Flonase (fluticasone) nasal spray: Spray 2 sprays per nostril twice daily.   -You scored 18/40 on your smell identification test. This means that you have anosmia.   -Olfactory (smell) retraining. The recommended website is www.abscent.org. To begin smell training, you will need a kit. The original smell training essential oils were oscar, lemon, clove and eucalyptus. These remain the standard fragrances for smell training kits, but there is no reason why you can t choose your own oils based on your personal preference. You can buy a smell training kit, or make your own. It s helpful to make a note of how your smell is right now. You can compare your level of smell and experiences today and after a couple of months of smell training. It will help you see your progress.      3.   Please return to the ENT clinic            How to Contact Us:  Send a [address] message to your provider. Our team will respond to you via [address]. Occasionally, we will need to call you to get further information.  For urgent matters (Monday-Friday), call the ENT Clinic: [phone] and speak with a call center team member - they will route your call appropriately.   If you'd like to speak directly with a nurse, please find our contact information below. We do our best to check voicemail frequently throughout the day, and will work to call you back within 1-2 days. For urgent matters, please use the general clinic phone numbers listed above.        Alexia PHIPPS RN  ENT RN Care Coordinator  Direct: [phone]  Ruby MARC LPN  Direct: [phone]         [facility]  Department of Otolaryngology

## 2023-07-07 NOTE — PROGRESS NOTES
[facility]                   New Patient Visit      Encounter date: July 7, 2023    Referring Provider:   Ema Duong, DO  [address] [place] [address]    Chief Complaint: nasal obstruction, hyposmia    History of Present Illness: Femi Soni presents for nasal obstruction. It is unilateral, more prominent on the right side.  Also associated with some snoring but no apneas. No trial of nasal medications to date.  No prior sinonasal surgery or trauma.  He denies recurrent sinus infections requiring abx or steroids.     Also has diminished smell and taste since COVID infection.  [place] about olfactory training but has yet to try in earnest.     UPSIT - 18/40 (anosmia)        Review of systems: A 14-point review of systems has been conducted and was negative for any notable symptoms, except as dictated in the history of present illness.     Past Medical History:   Diagnosis Date     CARDIOVASCULAR SCREENING; LDL GOAL LESS THAN 160      Hypertension      NO ACTIVE PROBLEMS         Past Surgical History:   Procedure Laterality Date     DAVINCI HERNIORRHAPHY INGUINAL Bilateral 5/16/2022    Procedure: ROBOT-ASSISTED BILATERAL INGUINAL HERNIA REPAIR;  Surgeon: Deo Craig MD;  Location:  OR      OR OCULAR DEVICE INTRAOP DETACHED RETINA  2003    Detached Retina Repair - Right Eye        Family History   Problem Relation Age of Onset     Diabetes Father         Social History     Socioeconomic History     Marital status: Single     Number of children: 0   Occupational History     Occupation: [employer]     Employer: [employer]   Tobacco Use     Smoking status: Never     Smokeless tobacco: Never   Vaping Use     Vaping Use: Never used   Substance and Sexual Activity     Alcohol use: Yes     Comment: Socially - 5 to 10 drinks a week     Drug use: No     Sexual activity: Yes     Partners: Female        Physical Exam:  Vital  "signs: BP (!) 153/100 (BP Location: Right arm, Patient Position: Sitting, Cuff Size: Adult Large)   Pulse 93   Ht 1.905 m (6' 3\")   Wt 106.6 kg (235 lb)   SpO2 98%   BMI 29.37 kg/m     General Appearance: No acute distress, appropriate demeanor, conversant  Eyes: moist conjunctivae; EOMI; pupils symmetric; visual acuity grossly intact; no proptosis  Head: normocephalic; overall symmetric appearance without deformity  Face: overall symmetric without deformity; HB I/VI  Ears: Normal appearance of external ear; external meatus normal in appearance; TMs intact without perforation bilaterally;   Nose: No external deformity; septum deviated to right and left causing greater than 70% obstruction; inferior turbinates with significant hypertrophy  Oral Cavity/oropharynx: Normal appearance of mucosa; tongue midline; no mass or lesions; oropharynx without obvious mucosal abnormality  Neck: no palpable lymphadenopathy; thyroid without palpable nodules  Lungs: symmetric chest rise; no wheezing  CV: Good distal perfusion; normal heart rate  Extremities: No deformity  Neurologic Exam: Cranial nerves II-XII are grossly intact; no focal deficit      Procedure Note  Procedure performed: Rigid nasal endoscopy  Indication: To evaluate for sinonasal pathology not visualized on routine anterior rhinoscopy  Anesthesia: 4% topical lidocaine with 0.05% oxymetazoline  Description of procedure: A 30 degree, 3 mm rigid endoscope was inserted into bilateral nasal cavities and the nasal valve, nasal cavity, middle meatus, sphenoethmoid recess, and nasopharynx were thoroughly evaluated for evidence of obstruction, edema, purulence, polyps and/or mass/lesion.     [place]aren Endoscopic Scoring System  Endoscopic observation Right Left   Polyps in middle meatus (0 = absent, 1 = restricted to middle meatus, 2 = Beyond middle meatus) 0 0   Discharge (0 = absent, 1 = thin and clear, 2 = thick, purulent) 0 0   Edema (0 = absent, 1 = " mild-moderate, 2 = moderate-severe) 0 0   Crusting (0 = absent, 1 = mild-moderate, 2 = moderate-severe) 0 0   Scarring (0= absent, 1 = mild-moderate, 2 = moderate-severe) 0 0   Total 0 0     Findings  RT: anterior septal deviation causing nasal valve stenosis; MM and SER clear  LT: posterior septal deviation/spur; MM and SER clear    Nasopharynx clear    The patient tolerated the procedure well without complication.     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 Please don't hesitate to contact me for any clarification needed regarding this note.

## 2023-07-07 NOTE — LETTER
7/7/2023       RE: Femi Soni  [address]  [place] [address]     Dear Colleague,    Thank you for referring your patient, Femi Soni, to the [facility] [place] at [facility]. Please see a copy of my visit note below.                       [facility]                   New Patient Visit      Encounter date: July 7, 2023    Referring Provider:   Ema Duong, DO  [address] [place] [address]    Chief Complaint: nasal obstruction, hyposmia    History of Present Illness: Femi Soni presents for nasal obstruction. It is unilateral, more prominent on the right side.  Also associated with some snoring but no apneas. No trial of nasal medications to date.  No prior sinonasal surgery or trauma.  He denies recurrent sinus infections requiring abx or steroids.     Also has diminished smell and taste since COVID infection.  [place] about olfactory training but has yet to try in earnest.     UPSIT - 18/40 (anosmia)        Review of systems: A 14-point review of systems has been conducted and was negative for any notable symptoms, except as dictated in the history of present illness.     Past Medical History:   Diagnosis Date    CARDIOVASCULAR SCREENING; LDL GOAL LESS THAN 160     Hypertension     NO ACTIVE PROBLEMS         Past Surgical History:   Procedure Laterality Date    DAVINCI HERNIORRHAPHY INGUINAL Bilateral 5/16/2022    Procedure: ROBOT-ASSISTED BILATERAL INGUINAL HERNIA REPAIR;  Surgeon: Deo Craig MD;  Location:  OR     OR OCULAR DEVICE INTRAOP DETACHED RETINA  2003    Detached Retina Repair - Right Eye        Family History   Problem Relation Age of Onset    Diabetes Father         Social History     Socioeconomic History    Marital status: Single    Number of children: 0   Occupational History    Occupation: [employer]      "Employer: [employer]   Tobacco Use    Smoking status: Never    Smokeless tobacco: Never   Vaping Use    Vaping Use: Never used   Substance and Sexual Activity    Alcohol use: Yes     Comment: Socially - 5 to 10 drinks a week    Drug use: No    Sexual activity: Yes     Partners: Female        Physical Exam:  Vital signs: BP (!) 153/100 (BP Location: Right arm, Patient Position: Sitting, Cuff Size: Adult Large)   Pulse 93   Ht 1.905 m (6' 3\")   Wt 106.6 kg (235 lb)   SpO2 98%   BMI 29.37 kg/m     General Appearance: No acute distress, appropriate demeanor, conversant  Eyes: moist conjunctivae; EOMI; pupils symmetric; visual acuity grossly intact; no proptosis  Head: normocephalic; overall symmetric appearance without deformity  Face: overall symmetric without deformity; HB I/VI  Ears: Normal appearance of external ear; external meatus normal in appearance; TMs intact without perforation bilaterally;   Nose: No external deformity; septum deviated to right and left causing greater than 70% obstruction; inferior turbinates with significant hypertrophy  Oral Cavity/oropharynx: Normal appearance of mucosa; tongue midline; no mass or lesions; oropharynx without obvious mucosal abnormality  Neck: no palpable lymphadenopathy; thyroid without palpable nodules  Lungs: symmetric chest rise; no wheezing  CV: Good distal perfusion; normal heart rate  Extremities: No deformity  Neurologic Exam: Cranial nerves II-XII are grossly intact; no focal deficit      Procedure Note  Procedure performed: Rigid nasal endoscopy  Indication: To evaluate for sinonasal pathology not visualized on routine anterior rhinoscopy  Anesthesia: 4% topical lidocaine with 0.05% oxymetazoline  Description of procedure: A 30 degree, 3 mm rigid endoscope was inserted into bilateral nasal cavities and the nasal valve, nasal cavity, middle meatus, sphenoethmoid recess, and nasopharynx were thoroughly evaluated for evidence of obstruction, edema, " purulence, polyps and/or mass/lesion.     [place]aren Endoscopic Scoring System  Endoscopic observation Right Left   Polyps in middle meatus (0 = absent, 1 = restricted to middle meatus, 2 = Beyond middle meatus) 0 0   Discharge (0 = absent, 1 = thin and clear, 2 = thick, purulent) 0 0   Edema (0 = absent, 1 = mild-moderate, 2 = moderate-severe) 0 0   Crusting (0 = absent, 1 = mild-moderate, 2 = moderate-severe) 0 0   Scarring (0= absent, 1 = mild-moderate, 2 = moderate-severe) 0 0   Total 0 0     Findings  RT: anterior septal deviation causing nasal valve stenosis; MM and SER clear  LT: posterior septal deviation/spur; MM and SER clear    Nasopharynx clear    The patient tolerated the procedure well without complication.     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 Please don't hesitate to contact me for any clarification needed regarding this note.           Again, thank you for allowing me to participate in the care of your patient.      Sincerely,    Scott Frazier MD

## 2023-07-10 NOTE — TELEPHONE ENCOUNTER
FUTURE VISIT INFORMATION      FUTURE VISIT INFORMATION:    Date: 8/23/23    Time: 1pm    [place] [facility] [place]FORMATION:    Referring provider:  Dr. Frazier     Referring providers clinic:   ENT    Reason for visit/diagnosis  septorhinoplasty consult referred by Dr. Frazier    RECORDS REQUESTED FROM:       Clinic name Comments Records Status Imaging Status    ENT  7/7/23- NOTE WAnel FRAZIER  [facility] 2/12/15- MR BRAIN  CE PACS

## 2023-07-17 ENCOUNTER — TELEPHONE (OUTPATIENT)
Dept: OTOLARYNGOLOGY | Facility: CLINIC | Age: 38
End: 2023-07-17
Payer: COMMERCIAL

## 2023-07-17 NOTE — TELEPHONE ENCOUNTER
Tried to LVM for patient regarding rescheduling appt with Dr. Mcclellan to 8/30 at same time. Patient's phone number won't connect properly. Tried calling 3 times.

## 2023-07-21 ENCOUNTER — TELEPHONE (OUTPATIENT)
Dept: OTOLARYNGOLOGY | Facility: CLINIC | Age: 38
End: 2023-07-21
Payer: COMMERCIAL

## 2023-07-21 NOTE — TELEPHONE ENCOUNTER
LVM for patient regarding rescheduling patient to another date due to max attempts reached. Left appt time, date and location on VM. Also sent appt letter to address in chart and [employer] message. Left my direct line for rescheduling or questions.

## 2023-08-23 ENCOUNTER — PRE VISIT (OUTPATIENT)
Dept: OTOLARYNGOLOGY | Facility: CLINIC | Age: 38
End: 2023-08-23

## 2023-12-18 DIAGNOSIS — R09.81 NASAL CONGESTION: ICD-10-CM

## 2023-12-20 RX ORDER — FLUTICASONE PROPIONATE 50 MCG
2 SPRAY, SUSPENSION (ML) NASAL DAILY
Qty: 9.9 ML | Refills: 1 | Status: SHIPPED | OUTPATIENT
Start: 2023-12-20

## 2023-12-20 NOTE — TELEPHONE ENCOUNTER
LVD 7/7/2023  [facility] [place]     Scott Frazier MD  Otolaryngology

## 2024-02-16 ENCOUNTER — OFFICE VISIT (OUTPATIENT)
Dept: FAMILY MEDICINE | Facility: CLINIC | Age: 39
End: 2024-02-16
Payer: COMMERCIAL

## 2024-02-16 VITALS
BODY MASS INDEX: 29.32 KG/M2 | WEIGHT: 235.8 LBS | SYSTOLIC BLOOD PRESSURE: 128 MMHG | HEART RATE: 110 BPM | HEIGHT: 75 IN | DIASTOLIC BLOOD PRESSURE: 82 MMHG | TEMPERATURE: 97.9 F | RESPIRATION RATE: 20 BRPM | OXYGEN SATURATION: 98 %

## 2024-02-16 DIAGNOSIS — R10.11 RUQ ABDOMINAL PAIN: Primary | ICD-10-CM

## 2024-02-16 LAB
ALBUMIN SERPL BCG-MCNC: 4.8 G/DL (ref 3.5–5.2)
ALP SERPL-CCNC: 94 U/L (ref 40–150)
ALT SERPL W P-5'-P-CCNC: 52 U/L (ref 0–70)
ANION GAP SERPL CALCULATED.3IONS-SCNC: 12 MMOL/L (ref 7–15)
AST SERPL W P-5'-P-CCNC: 30 U/L (ref 0–45)
BASOPHILS # BLD AUTO: 0 10E3/UL (ref 0–0.2)
BASOPHILS NFR BLD AUTO: 1 %
BILIRUB SERPL-MCNC: 0.7 MG/DL
BUN SERPL-MCNC: 12.6 MG/DL (ref 6–20)
CALCIUM SERPL-MCNC: 10.1 MG/DL (ref 8.6–10)
CHLORIDE SERPL-SCNC: 104 MMOL/L (ref 98–107)
CREAT SERPL-MCNC: 0.86 MG/DL (ref 0.67–1.17)
CRP SERPL-MCNC: <3 MG/L
DEPRECATED HCO3 PLAS-SCNC: 23 MMOL/L (ref 22–29)
EGFRCR SERPLBLD CKD-EPI 2021: >90 ML/MIN/1.73M2
EOSINOPHIL # BLD AUTO: 0.2 10E3/UL (ref 0–0.7)
EOSINOPHIL NFR BLD AUTO: 4 %
ERYTHROCYTE [DISTWIDTH] IN BLOOD BY AUTOMATED COUNT: 11.6 % (ref 10–15)
GLUCOSE SERPL-MCNC: 107 MG/DL (ref 70–99)
HCT VFR BLD AUTO: 50.7 % (ref 40–53)
HGB BLD-MCNC: 17.4 G/DL (ref 13.3–17.7)
IMM GRANULOCYTES # BLD: 0 10E3/UL
IMM GRANULOCYTES NFR BLD: 0 %
LIPASE SERPL-CCNC: 41 U/L (ref 13–60)
LYMPHOCYTES # BLD AUTO: 1.8 10E3/UL (ref 0.8–5.3)
LYMPHOCYTES NFR BLD AUTO: 31 %
MCH RBC QN AUTO: 30.5 PG (ref 26.5–33)
MCHC RBC AUTO-ENTMCNC: 34.3 G/DL (ref 31.5–36.5)
MCV RBC AUTO: 89 FL (ref 78–100)
MONOCYTES # BLD AUTO: 0.5 10E3/UL (ref 0–1.3)
MONOCYTES NFR BLD AUTO: 8 %
NEUTROPHILS # BLD AUTO: 3.3 10E3/UL (ref 1.6–8.3)
NEUTROPHILS NFR BLD AUTO: 57 %
PLATELET # BLD AUTO: 226 10E3/UL (ref 150–450)
POTASSIUM SERPL-SCNC: 3.8 MMOL/L (ref 3.4–5.3)
PROT SERPL-MCNC: 7.6 G/DL (ref 6.4–8.3)
RBC # BLD AUTO: 5.7 10E6/UL (ref 4.4–5.9)
SODIUM SERPL-SCNC: 139 MMOL/L (ref 135–145)
WBC # BLD AUTO: 5.8 10E3/UL (ref 4–11)

## 2024-02-16 PROCEDURE — 99213 OFFICE O/P EST LOW 20 MIN: CPT

## 2024-02-16 PROCEDURE — 85025 COMPLETE CBC W/AUTO DIFF WBC: CPT

## 2024-02-16 PROCEDURE — 83690 ASSAY OF LIPASE: CPT

## 2024-02-16 PROCEDURE — 80053 COMPREHEN METABOLIC PANEL: CPT

## 2024-02-16 PROCEDURE — 86140 C-REACTIVE PROTEIN: CPT

## 2024-02-16 PROCEDURE — 36415 COLL VENOUS BLD VENIPUNCTURE: CPT

## 2024-02-16 ASSESSMENT — PAIN SCALES - GENERAL: PAINLEVEL: MILD PAIN (2)

## 2024-02-16 NOTE — PROGRESS NOTES
"  Assessment & Plan     RUQ abdominal pain  - RUQ discomfort with some mild tenderness on palpation to epigastric and RLQ  - CRP inflammation  - CBC with Platelets & Differential  - Lipase  - US Abdomen Complete  - Comprehensive metabolic panel          BMI  Estimated body mass index is 29.47 kg/m  as calculated from the following:    Height as of this encounter: 1.905 m (6' 3\").    Weight as of this encounter: 107 kg (235 lb 12.8 oz).         FUTURE APPOINTMENTS:       - Follow-up in 1-2 weeks if symptoms do not improve or worsen       - Follow-up for annual visit or as needed    Sandra Kahn is a 38 year old, presenting for the following health issues:  Abdominal Pain (Pain below right rib cage going on almost a month.  Patient denies trauma to the site.)    Has been noticing some right upper quadrant/lower rib pain since about mid January. Has had a hernia before and says this does not feel like that sort of pain. Discomfort does not change with food or drinks, does not come on a specific times of the day. Is able to go to the gym and lift weights without major symptoms. He feels it when bending or laying on the right side. Doesn't impact sleep. Doesn't recall any specific event or injury to the area. Feels very localized, does not radiate or go to the back. Describes it as \"achey,\" not sharp. Hasn't really changed in severity much, maybe feels that it is a little more noticeable. Has not tried any remedies - no heat/ice, tylenol/ibuprofen. Having regular bowel movements.  No recent illness, fever, cough, SOB/CP, diffuse abdominal pain, N/V/D. No changes to bowel patterns. No blood in stool.  No major health history, did have a hernia surgery in the past couple years.    History of Present Illness       Reason for visit:  Abdominal pain below my right ribcage.  Symptom onset:  3-4 weeks ago  Symptoms include:  Discomfort below my right rib cage  Symptom intensity:  Mild  Symptom progression:  Staying the " "same  Had these symptoms before:  No  What makes it worse:  Bending over on my right side  What makes it better:  The discomfort is persistant but not always noticable.    He eats 2-3 servings of fruits and vegetables daily.He consumes 0 sweetened beverage(s) daily.He exercises with enough effort to increase his heart rate 30 to 60 minutes per day.  He exercises with enough effort to increase his heart rate 4 days per week.   He is taking medications regularly.                 Review of Systems  Constitutional, HEENT, cardiovascular, pulmonary, gi and gu systems are negative, except as otherwise noted.      Objective    /82 (BP Location: Right arm, Patient Position: Sitting, Cuff Size: Adult Large)   Pulse 110   Temp 97.9  F (36.6  C) (Temporal)   Resp 20   Ht 1.905 m (6' 3\")   Wt 107 kg (235 lb 12.8 oz)   SpO2 98%   BMI 29.47 kg/m    Body mass index is 29.47 kg/m .  Physical Exam  Vitals reviewed.   Constitutional:       Appearance: Normal appearance.   HENT:      Head: Normocephalic.   Eyes:      Pupils: Pupils are equal, round, and reactive to light.   Cardiovascular:      Rate and Rhythm: Normal rate and regular rhythm.      Pulses: Normal pulses.      Heart sounds: Normal heart sounds.   Pulmonary:      Effort: Pulmonary effort is normal.      Breath sounds: Normal breath sounds.   Abdominal:      Palpations: Abdomen is soft.      Tenderness: There is abdominal tenderness in the right upper quadrant, right lower quadrant and epigastric area. There is no guarding or rebound.   Musculoskeletal:         General: Normal range of motion.   Skin:     General: Skin is warm and dry.   Neurological:      General: No focal deficit present.      Mental Status: He is alert and oriented to person, place, and time.   Psychiatric:         Mood and Affect: Mood normal.         Behavior: Behavior normal.                    Signed Electronically by: Hailee Singleton, ENRRIQUE, APRN, CNP      "

## 2024-02-20 DIAGNOSIS — R10.11 RUQ ABDOMINAL PAIN: Primary | ICD-10-CM

## 2024-02-21 ENCOUNTER — ANCILLARY PROCEDURE (OUTPATIENT)
Dept: ULTRASOUND IMAGING | Facility: CLINIC | Age: 39
End: 2024-02-21
Payer: COMMERCIAL

## 2024-02-21 DIAGNOSIS — R10.11 RUQ ABDOMINAL PAIN: ICD-10-CM

## 2024-02-21 PROCEDURE — 76705 ECHO EXAM OF ABDOMEN: CPT

## 2024-06-28 DIAGNOSIS — I10 PRIMARY HYPERTENSION: ICD-10-CM

## 2024-06-28 RX ORDER — AMLODIPINE BESYLATE 10 MG/1
10 TABLET ORAL DAILY
Qty: 90 TABLET | Refills: 1 | Status: SHIPPED | OUTPATIENT
Start: 2024-06-28

## 2024-07-06 ENCOUNTER — HEALTH MAINTENANCE LETTER (OUTPATIENT)
Age: 39
End: 2024-07-06

## 2025-01-08 DIAGNOSIS — I10 PRIMARY HYPERTENSION: ICD-10-CM

## 2025-01-08 RX ORDER — AMLODIPINE BESYLATE 10 MG/1
10 TABLET ORAL DAILY
Qty: 90 TABLET | Refills: 0 | Status: SHIPPED | OUTPATIENT
Start: 2025-01-08

## 2025-05-16 ENCOUNTER — E-VISIT (OUTPATIENT)
Dept: FAMILY MEDICINE | Facility: CLINIC | Age: 40
End: 2025-05-16
Payer: COMMERCIAL

## 2025-05-16 DIAGNOSIS — I10 PRIMARY HYPERTENSION: ICD-10-CM

## 2025-05-17 RX ORDER — AMLODIPINE BESYLATE 10 MG/1
10 TABLET ORAL DAILY
Qty: 90 TABLET | Refills: 0 | Status: SHIPPED | OUTPATIENT
Start: 2025-05-17

## 2025-05-17 NOTE — PATIENT INSTRUCTIONS
Thank you for choosing us for your care. I think an in-clinic or virtual visit would be the best next step based on your symptoms. Please schedule a clinic appointment; you won t be charged for this eVisit.      You can schedule an appointment by clicking here in [employer], or call [phone].

## 2025-07-13 ENCOUNTER — HEALTH MAINTENANCE LETTER (OUTPATIENT)
Age: 40
End: 2025-07-13

## (undated) DEVICE — GOWN IMPERVIOUS SPECIALTY XLG/XLONG 32474

## (undated) DEVICE — GLOVE GAMMEX DERMAPRENE ULTRA SZ 8 LF 8516

## (undated) DEVICE — SU VICRYL 4-0 PS-2 18" UND J496H

## (undated) DEVICE — DAVINCI XI DRAPE ARM 470015

## (undated) DEVICE — DAVINCI XI OBTURATOR BLADELESS 8MM 470359

## (undated) DEVICE — DAVINCI XI MONOPOLAR SCISSORS HOT SHEARS 8MM 470179

## (undated) DEVICE — LINEN TOWEL PACK X5 5464

## (undated) DEVICE — LIGHT HANDLE X2

## (undated) DEVICE — LUBRICANT INST ELECTROLUBE EL101

## (undated) DEVICE — GLOVE PROTEXIS W/NEU-THERA 7.5  2D73TE75

## (undated) DEVICE — DRAPE SHEET REV FOLD 3/4 9349

## (undated) DEVICE — DAVINCI XI SEAL UNIVERSAL 5-8MM 470361

## (undated) DEVICE — DAVINCI XI GRASPER ENDOWRIST PROGRASP 470093

## (undated) DEVICE — DAVINCI XI DRAPE COLUMN 470341

## (undated) DEVICE — SYR 10ML FINGER CONTROL W/O NDL 309695

## (undated) DEVICE — SOL WATER IRRIG 1000ML BOTTLE 2F7114

## (undated) DEVICE — DAVINCI HOT SHEARS TIP COVER  400180

## (undated) DEVICE — NDL INSUFFLATION 13GA 120MM C2201

## (undated) DEVICE — ESU GROUND PAD UNIVERSAL W/O CORD

## (undated) DEVICE — SU STRATAFIX PDS PLUS 3-0 SPIRAL SH 15CM SXPP1B420

## (undated) DEVICE — SYSTEM LAPAROVUE VISIBILITY LAPVUE10

## (undated) DEVICE — DAVINCI XI NDL DRIVER LARGE 470006

## (undated) DEVICE — PREP CHLORAPREP 26ML TINTED ORANGE  260815

## (undated) DEVICE — PACK LAP CHOLE SLC15LCFSD

## (undated) DEVICE — BLADE CLIPPER 4406

## (undated) DEVICE — SYSTEM CLEARIFY VISUALIZATION 21-345

## (undated) RX ORDER — LIDOCAINE HYDROCHLORIDE 20 MG/ML
INJECTION, SOLUTION EPIDURAL; INFILTRATION; INTRACAUDAL; PERINEURAL
Status: DISPENSED
Start: 2022-03-01

## (undated) RX ORDER — FENTANYL CITRATE 0.05 MG/ML
INJECTION, SOLUTION INTRAMUSCULAR; INTRAVENOUS
Status: DISPENSED
Start: 2022-05-16

## (undated) RX ORDER — FENTANYL CITRATE 50 UG/ML
INJECTION, SOLUTION INTRAMUSCULAR; INTRAVENOUS
Status: DISPENSED
Start: 2022-03-01

## (undated) RX ORDER — HYDROMORPHONE HYDROCHLORIDE 1 MG/ML
INJECTION, SOLUTION INTRAMUSCULAR; INTRAVENOUS; SUBCUTANEOUS
Status: DISPENSED
Start: 2022-05-16

## (undated) RX ORDER — NEOSTIGMINE METHYLSULFATE 1 MG/ML
VIAL (ML) INJECTION
Status: DISPENSED
Start: 2022-05-16

## (undated) RX ORDER — FENTANYL CITRATE 50 UG/ML
INJECTION, SOLUTION INTRAMUSCULAR; INTRAVENOUS
Status: DISPENSED
Start: 2022-05-16

## (undated) RX ORDER — BUPIVACAINE HYDROCHLORIDE 2.5 MG/ML
INJECTION, SOLUTION EPIDURAL; INFILTRATION; INTRACAUDAL
Status: DISPENSED
Start: 2022-03-01

## (undated) RX ORDER — DEXAMETHASONE SODIUM PHOSPHATE 4 MG/ML
INJECTION, SOLUTION INTRA-ARTICULAR; INTRALESIONAL; INTRAMUSCULAR; INTRAVENOUS; SOFT TISSUE
Status: DISPENSED
Start: 2022-05-16

## (undated) RX ORDER — LIDOCAINE HYDROCHLORIDE 20 MG/ML
INJECTION, SOLUTION EPIDURAL; INFILTRATION; INTRACAUDAL; PERINEURAL
Status: DISPENSED
Start: 2022-05-16

## (undated) RX ORDER — PROPOFOL 10 MG/ML
INJECTION, EMULSION INTRAVENOUS
Status: DISPENSED
Start: 2022-03-01

## (undated) RX ORDER — HYDROMORPHONE HYDROCHLORIDE 1 MG/ML
INJECTION, SOLUTION INTRAMUSCULAR; INTRAVENOUS; SUBCUTANEOUS
Status: DISPENSED
Start: 2022-03-01

## (undated) RX ORDER — OXYCODONE HYDROCHLORIDE 5 MG/1
TABLET ORAL
Status: DISPENSED
Start: 2022-05-16

## (undated) RX ORDER — BUPIVACAINE HYDROCHLORIDE AND EPINEPHRINE 2.5; 5 MG/ML; UG/ML
INJECTION, SOLUTION EPIDURAL; INFILTRATION; INTRACAUDAL; PERINEURAL
Status: DISPENSED
Start: 2022-05-16

## (undated) RX ORDER — ONDANSETRON 2 MG/ML
INJECTION INTRAMUSCULAR; INTRAVENOUS
Status: DISPENSED
Start: 2022-05-16

## (undated) RX ORDER — ONDANSETRON 2 MG/ML
INJECTION INTRAMUSCULAR; INTRAVENOUS
Status: DISPENSED
Start: 2022-03-01

## (undated) RX ORDER — EPINEPHRINE 1 MG/ML
INJECTION, SOLUTION INTRAMUSCULAR; SUBCUTANEOUS
Status: DISPENSED
Start: 2022-03-01

## (undated) RX ORDER — CEFAZOLIN SODIUM/WATER 2 G/20 ML
SYRINGE (ML) INTRAVENOUS
Status: DISPENSED
Start: 2022-05-16

## (undated) RX ORDER — GLYCOPYRROLATE 0.2 MG/ML
INJECTION, SOLUTION INTRAMUSCULAR; INTRAVENOUS
Status: DISPENSED
Start: 2022-05-16

## (undated) RX ORDER — PROPOFOL 10 MG/ML
INJECTION, EMULSION INTRAVENOUS
Status: DISPENSED
Start: 2022-05-16

## (undated) RX ORDER — DEXAMETHASONE SODIUM PHOSPHATE 4 MG/ML
INJECTION, SOLUTION INTRA-ARTICULAR; INTRALESIONAL; INTRAMUSCULAR; INTRAVENOUS; SOFT TISSUE
Status: DISPENSED
Start: 2022-03-01